# Patient Record
Sex: FEMALE | Race: WHITE | Employment: UNEMPLOYED | URBAN - METROPOLITAN AREA
[De-identification: names, ages, dates, MRNs, and addresses within clinical notes are randomized per-mention and may not be internally consistent; named-entity substitution may affect disease eponyms.]

---

## 2018-03-13 ENCOUNTER — OFFICE VISIT (OUTPATIENT)
Dept: PRIMARY CARE CLINIC | Age: 23
End: 2018-03-13
Payer: COMMERCIAL

## 2018-03-13 VITALS
BODY MASS INDEX: 22.13 KG/M2 | DIASTOLIC BLOOD PRESSURE: 66 MMHG | TEMPERATURE: 98.4 F | WEIGHT: 141 LBS | HEIGHT: 67 IN | SYSTOLIC BLOOD PRESSURE: 116 MMHG | HEART RATE: 78 BPM

## 2018-03-13 DIAGNOSIS — L70.8 OTHER ACNE: ICD-10-CM

## 2018-03-13 DIAGNOSIS — Z02.1 PHYSICAL EXAM, PRE-EMPLOYMENT: Primary | ICD-10-CM

## 2018-03-13 PROCEDURE — 99213 OFFICE O/P EST LOW 20 MIN: CPT | Performed by: INTERNAL MEDICINE

## 2018-03-13 RX ORDER — LITHIUM CARBONATE 450 MG
TABLET, EXTENDED RELEASE ORAL
Refills: 2 | COMMUNITY
Start: 2018-02-17 | End: 2018-03-13 | Stop reason: SDUPTHER

## 2018-03-13 ASSESSMENT — ENCOUNTER SYMPTOMS
EYE DISCHARGE: 0
NAUSEA: 0
ORTHOPNEA: 0
ABDOMINAL PAIN: 0
BLOOD IN STOOL: 0
SHORTNESS OF BREATH: 0
HEMOPTYSIS: 0
BLURRED VISION: 0

## 2018-03-13 ASSESSMENT — PATIENT HEALTH QUESTIONNAIRE - PHQ9
SUM OF ALL RESPONSES TO PHQ QUESTIONS 1-9: 0
SUM OF ALL RESPONSES TO PHQ9 QUESTIONS 1 & 2: 0
2. FEELING DOWN, DEPRESSED OR HOPELESS: 0
1. LITTLE INTEREST OR PLEASURE IN DOING THINGS: 0

## 2020-12-09 ENCOUNTER — OFFICE VISIT (OUTPATIENT)
Dept: PRIMARY CARE CLINIC | Age: 25
End: 2020-12-09
Payer: COMMERCIAL

## 2020-12-09 VITALS
TEMPERATURE: 99 F | OXYGEN SATURATION: 98 % | DIASTOLIC BLOOD PRESSURE: 80 MMHG | HEART RATE: 88 BPM | WEIGHT: 160 LBS | SYSTOLIC BLOOD PRESSURE: 118 MMHG | BODY MASS INDEX: 25.11 KG/M2 | HEIGHT: 67 IN

## 2020-12-09 LAB
Lab: NORMAL
QC PASS/FAIL: NORMAL
SARS-COV-2, POC: NORMAL

## 2020-12-09 PROCEDURE — 87426 SARSCOV CORONAVIRUS AG IA: CPT | Performed by: NURSE PRACTITIONER

## 2020-12-09 PROCEDURE — G8427 DOCREV CUR MEDS BY ELIG CLIN: HCPCS | Performed by: NURSE PRACTITIONER

## 2020-12-09 PROCEDURE — 4004F PT TOBACCO SCREEN RCVD TLK: CPT | Performed by: NURSE PRACTITIONER

## 2020-12-09 PROCEDURE — G8419 CALC BMI OUT NRM PARAM NOF/U: HCPCS | Performed by: NURSE PRACTITIONER

## 2020-12-09 PROCEDURE — 99213 OFFICE O/P EST LOW 20 MIN: CPT | Performed by: NURSE PRACTITIONER

## 2020-12-09 PROCEDURE — G8484 FLU IMMUNIZE NO ADMIN: HCPCS | Performed by: NURSE PRACTITIONER

## 2020-12-09 RX ORDER — AZITHROMYCIN 250 MG/1
250 TABLET, FILM COATED ORAL SEE ADMIN INSTRUCTIONS
Qty: 6 TABLET | Refills: 0 | Status: SHIPPED | OUTPATIENT
Start: 2020-12-09 | End: 2020-12-14

## 2020-12-09 RX ORDER — BROMPHENIRAMINE MALEATE, PSEUDOEPHEDRINE HYDROCHLORIDE, AND DEXTROMETHORPHAN HYDROBROMIDE 2; 30; 10 MG/5ML; MG/5ML; MG/5ML
10 SYRUP ORAL 4 TIMES DAILY PRN
Qty: 120 ML | Refills: 0 | Status: SHIPPED
Start: 2020-12-09 | End: 2021-01-24 | Stop reason: ALTCHOICE

## 2020-12-09 NOTE — PATIENT INSTRUCTIONS
Juan Knight [address] [place] [id number]  Dept: [address] [place], [place] [id number] CNP      12/9/2020     Patient: Raffy Srinivasan   YOB: 1995       To Whom It May Concern: It is my medical opinion that Raffy Srinivasan should remain out of work while acutely ill and awaiting COVID-19 test results. Return to work with no retesting should be followed if test is negative AND meets these 3 criteria as outlined by CDC/ODH:   a. No fever without the use of fever reducers for 24 hours  b. Improvement in symptoms  c. At least 7 days since the onset of symptoms     If tests positive for COVID-19, needs minimum of 10 days strict quarantine, improvement of symptoms and 24 hours fever free without fever reducing medications. If you have any questions or concerns, please don't hesitate to call.     Sincerely,        JOHN Cole - ALFREDO

## 2020-12-09 NOTE — PROGRESS NOTES
Amanda Spine  1995    Chief Complaint   Patient presents with    Nausea & Vomiting     Pt C/O nausea, vomiting, cough, and ST x 1 week      Respiratory Symptoms:  Patient complains of 1 week(s) history of sore throat, non-productive cough and nausea with vomiting. Symptoms have been worsening with time. She denies any other symptoms . Relevant PMH: No pertinent PMH. Smoking history:  She  reports that she has been smoking cigarettes. She has a 0.25 pack-year smoking history. She has never used smokeless tobacco.     She has had ill contacts with URI symptoms. Treatment to date: [facility]    Travel screen completed:  Yes       Denies history of asthma, COPD, or seasonal allergies. Works at: [employer]    LMP: November 28th, denies chance of pregnancy    Vitals:    12/09/20 1544   BP: 118/80   Pulse: 88   Temp: 99 °F (37.2 °C)   SpO2: 98%     SOB: no  Fever: ,reports low-grade, subjective chills  Cough: yes, green mucus  Nasal congestion/rhinorrhea: rhinorrhea  Sinus pressure: no; headache yes, improved  Myalgias: yes  Sore throat: yes; able to eat & drink  Recent known exposure to COVID-19:  cousin  Nausea & Vomiting: yes  Diarrhea & bloody stools: no    Physical Exam    Physical Exam  Vitals signs and nursing note reviewed. Constitutional:       General: She is not in acute distress. Appearance: Normal appearance. She is well-developed and normal weight. She is ill-appearing. She is not toxic-appearing or diaphoretic. HENT:      Head: Normocephalic and atraumatic. Right Ear: Tympanic membrane, ear canal and external ear normal. There is no impacted cerumen. Left Ear: Tympanic membrane, ear canal and external ear normal. There is no impacted cerumen. Nose: Congestion and rhinorrhea present. Mouth/Throat:      Mouth: Mucous membranes are moist.      Pharynx: Oropharynx is clear. Posterior oropharyngeal erythema present. No oropharyngeal exudate. Comments: Tonsils +1, right sided adenopathy  Eyes:      Extraocular Movements: Extraocular movements intact. Conjunctiva/sclera: Conjunctivae normal.      Pupils: Pupils are equal, round, and reactive to light. Neck:      Musculoskeletal: Normal range of motion and neck supple. Thyroid: No thyromegaly. Cardiovascular:      Rate and Rhythm: Normal rate and regular rhythm. Pulses: Normal pulses. Heart sounds: Normal heart sounds. No murmur. Pulmonary:      Effort: Pulmonary effort is normal. No respiratory distress. Breath sounds: Normal breath sounds. No wheezing. Abdominal:      General: Bowel sounds are normal.      Palpations: Abdomen is soft. Tenderness: There is no abdominal tenderness. There is no guarding or rebound. Genitourinary:     Vagina: Normal.   Musculoskeletal: Normal range of motion. Lymphadenopathy:      Cervical: No cervical adenopathy. Skin:     General: Skin is warm and dry. Capillary Refill: Capillary refill takes less than 2 seconds. Findings: No bruising, erythema or rash. Neurological:      General: No focal deficit present. Mental Status: She is alert and oriented to person, place, and time. Mental status is at baseline. Cranial Nerves: No cranial nerve deficit. Sensory: No sensory deficit. Motor: No weakness. Coordination: Coordination normal.      Gait: Gait normal.   Psychiatric:         Mood and Affect: Mood normal.         Behavior: Behavior normal.         Thought Content: Thought content normal.         Judgment: Judgment normal.         Assessment/Plan:    ASSESSMENT/PLAN:    1. COVID-19  - POCT COVID-19, Antigen (-) in office today  - COVID-19 Ambulatory; Future    2. Tonsillitis  - azithromycin (ZITHROMAX) 250 MG tablet; Take 1 tablet by mouth See Admin Instructions for 5 days 500mg on day 1 followed by 250mg on days 2 - 5  Dispense: 6 tablet; Refill: 0    3.  Cough  - brompheniramine-pseudoephedrine-DM (BROMFED DM) 2-30-10 MG/5ML syrup; Take 10 mLs by mouth 4 times daily as needed for Congestion or Cough  Dispense: 120 mL; Refill: 0      - Conservative methods & OTC for symptomatic relief provided  -  Red flag items discussed  - Work/school instructions provided in AVS    This visit was provided as a focused evaluation during the COVID -19 pandemic/national emergency. A comprehensive review of all previous patient history and testing was not conducted. Pertinent findings were elicited during the visit.

## 2020-12-10 DIAGNOSIS — U07.1 COVID-19: ICD-10-CM

## 2020-12-10 LAB — SARS-COV-2, PCR: NOT DETECTED

## 2021-01-24 ENCOUNTER — HOSPITAL ENCOUNTER (EMERGENCY)
Age: 26
Discharge: HOME | End: 2021-01-24
Attending: EMERGENCY MEDICINE
Payer: COMMERCIAL

## 2021-01-24 VITALS
OXYGEN SATURATION: 99 % | SYSTOLIC BLOOD PRESSURE: 128 MMHG | HEART RATE: 84 BPM | RESPIRATION RATE: 16 BRPM | TEMPERATURE: 98.8 F | WEIGHT: 150 LBS | DIASTOLIC BLOOD PRESSURE: 80 MMHG | BODY MASS INDEX: 23.49 KG/M2

## 2021-01-24 DIAGNOSIS — G51.0 BELL'S PALSY: Primary | ICD-10-CM

## 2021-01-24 LAB
HCG, URINE, POC: NEGATIVE
Lab: NORMAL
NEGATIVE QC PASS/FAIL: NORMAL
POSITIVE QC PASS/FAIL: NORMAL

## 2021-01-24 PROCEDURE — 99283 EMERGENCY DEPT VISIT LOW MDM: CPT

## 2021-01-24 PROCEDURE — 6370000000 HC RX 637 (ALT 250 FOR IP): Performed by: STUDENT IN AN ORGANIZED HEALTH CARE EDUCATION/TRAINING PROGRAM

## 2021-01-24 RX ORDER — PREDNISONE 20 MG/1
60 TABLET ORAL ONCE
Status: COMPLETED | OUTPATIENT
Start: 2021-01-24 | End: 2021-01-24

## 2021-01-24 RX ORDER — ERYTHROMYCIN 20 MG/G
GEL TOPICAL NIGHTLY
Qty: 1 TUBE | Refills: 0 | Status: SHIPPED | OUTPATIENT
Start: 2021-01-24 | End: 2022-05-17 | Stop reason: ALTCHOICE

## 2021-01-24 RX ORDER — VALACYCLOVIR HYDROCHLORIDE 500 MG/1
1000 TABLET, FILM COATED ORAL ONCE
Status: DISCONTINUED | OUTPATIENT
Start: 2021-01-24 | End: 2021-01-24

## 2021-01-24 RX ORDER — VALACYCLOVIR HYDROCHLORIDE 1 G/1
1000 TABLET, FILM COATED ORAL 3 TIMES DAILY
Qty: 20 TABLET | Refills: 0 | Status: SHIPPED | OUTPATIENT
Start: 2021-01-24 | End: 2021-01-24

## 2021-01-24 RX ORDER — PREDNISONE 20 MG/1
60 TABLET ORAL DAILY
Qty: 18 TABLET | Refills: 0 | Status: SHIPPED | OUTPATIENT
Start: 2021-01-24 | End: 2021-01-30

## 2021-01-24 RX ADMIN — PREDNISONE 60 MG: 20 TABLET ORAL at 15:59

## 2021-01-24 ASSESSMENT — ENCOUNTER SYMPTOMS
ABDOMINAL DISTENTION: 0
BACK PAIN: 0
VOMITING: 0
DIARRHEA: 0
PHOTOPHOBIA: 0
COLOR CHANGE: 0
SINUS PRESSURE: 0
WHEEZING: 0
SHORTNESS OF BREATH: 0
SORE THROAT: 0
COUGH: 0
NAUSEA: 0

## 2021-01-24 NOTE — ED PROVIDER NOTES
[address]      Pt Name: Nadine Velasquez  MRN: [id number]  [place] 1995  Date of evaluation: 1/24/2021      CHIEF COMPLAINT       Chief Complaint   Patient presents with    Other     right sided facial droop        HPI  Nadine Velasquez is a 30 y.o. female with a past medical history of bipolar disorder on Invega, acne on Accutane presents with complaints of right-sided facial droop starting yesterday. But states that she has had eye tearing for the last 7 days. States that she does have difficulty noticing her face that she will require some mask. .  States that she does not have a history. No prodromal symptoms. No alleviating or exacerbating factors. She does not take any medications measures alleviate her symptoms. Denies any fevers, chills, nausea, vomiting, chest pain, shortness of breath, dumping flank pain, dysuria, hematuria, diarrhea, constipation or new rashes or sores. Except as noted above the remainder of the review of systems was reviewed and negative. Review of Systems   Constitutional: Negative for chills and fever. HENT: Negative for ear pain, sinus pressure and sore throat. Eyes: Negative for photophobia and visual disturbance. Respiratory: Negative for cough, shortness of breath and wheezing. Cardiovascular: Negative for chest pain. Gastrointestinal: Negative for abdominal distention, diarrhea, nausea and vomiting. Genitourinary: Negative for dysuria and frequency. Musculoskeletal: Negative for arthralgias and back pain. Skin: Negative for color change, pallor, rash and wound. Neurological: Negative for weakness and headaches. Hematological: Negative for adenopathy. All other systems reviewed and are negative. Physical Exam  Vitals signs and nursing note reviewed. Constitutional:       General: She is not in acute distress. Appearance: Normal appearance.  She is well-developed. She is not ill-appearing or diaphoretic. HENT:      Head: Normocephalic and atraumatic. Nose: Nose normal.   Eyes:      Extraocular Movements: Extraocular movements intact. Pupils: Pupils are equal, round, and reactive to light. Neck:      Musculoskeletal: Normal range of motion and neck supple. Cardiovascular:      Rate and Rhythm: Normal rate and regular rhythm. Pulses: Normal pulses. Heart sounds: Normal heart sounds. Pulmonary:      Effort: Pulmonary effort is normal. No respiratory distress. Breath sounds: Normal breath sounds. No wheezing or rales. Abdominal:      General: Bowel sounds are normal.      Palpations: Abdomen is soft. Tenderness: There is no abdominal tenderness. There is no guarding or rebound. Musculoskeletal: Normal range of motion. Skin:     General: Skin is warm and dry. Capillary Refill: Capillary refill takes less than 2 seconds. Neurological:      Mental Status: She is alert and oriented to person, place, and time. Cranial Nerves: No cranial nerve deficit. Sensory: No sensory deficit. Coordination: Coordination normal.      Comments: Complete facial paralysis of right side of face. Psychiatric:         Mood and Affect: Mood normal.         Behavior: Behavior normal.          Procedures     MDM  Number of Diagnoses or Management Options  Bell's palsy  Diagnosis management comments: 24-year-old female with past medical history of acne on Accutane, bipolar disorder recently started on Invega presents with complaints of complete right facial paralysis starting yesterday. States that she did have eye tearing for the last week. States is never happened before. Vitals within normal limits. On physical exam patient is in no acute respiratory process, oriented x3. She is complete facial paralysis right side of her face. Normal S1-S2. Abdomen nontender. Musculoskeletal bilaterally.   No bilateral leg edema. Patient has complete paralysis of the right side of her face. Patient is unable to close her eyes fully. POC pregnancy negative. From her history is sounds like her symptoms started a week prior. She does not meet criteria of receiving valacyclovir as she is past 3 days window. Patient will be given prednisone and erythromycin vomiting solution in the department. She will continue prednisone therapy for the next 6 days. She was instructed to use expiratory myosin gel on her eyes at night. She was informed of all the results for evaluation. She is agreeable plan for discharge. Patient is awake alert, hemodynamic stable, afebrile and in no respiratory distress. Discussed with patient plan for close outpatient follow-up with the patient's PCP as well as return precautions and the patient understands and agrees to the plan. ED Course as of Jan 25 0415   Sun Jan 24, 2021   1530 ATTENDING PROVIDER ATTESTATION:     Bryant Dickey presented to the emergency department for evaluation of [unfilled]  I have reviewed and discussed the case, including pertinent history (medical, surgical, family and social) and exam findings with the Midlevel and the Nurse assigned to Bryant Dickey. I have personally performed and/or participated in the history, exam, medical decision making, and procedures and agree with all pertinent clinical information. I have reviewed my findings and recommendations with Bryant Dickey and members of family present at the time of disposition. My findings/plan: Patient is a 26-year-old female who presents with a chief complaint of right-sided facial droop. The patient states that she started having tearing of her right eye about a week ago and she was told to get rewetting drops. For the past couple days people started noticed that she has a droop of her right side of her face. Patient denies any recent illnesses. Denies any recent head trauma or falls. She does of a history of bipolar disorder and did receive an intramuscular injection of Invega 2 weeks ago. This was the first dose. No other medication changes. She is on Accutane that was started in October 2020. Patient denies any fevers, chills, lightheadedness, dizziness, blurred vision, chest pain, shortness of breath, abdominal pain, nausea, vomiting. On examination the patient is resting comfortably in bed. No external evidence of head trauma. No hemotympanum noted. Pupils are equal reactive bilaterally. No conjunctival injection noted bilaterally. Patient does have right-sided facial paralysis including the forehead. She is unable to fully close her right eyelid. Clear breath sounds in all lung fields. Regular rate and rhythm of the heart. No abdominal tenderness palpation. No lower extremity edema. Besides a facial droop the patient has no other focal neurological deficits. Tarik Bradford DO      [MS]      ED Course User Index  [MS] Melina Floyd DO           ED Course as of Jan 25 0415   Sun Jan 24, 2021   1530 ATTENDING PROVIDER ATTESTATION:     Laya Akins presented to the emergency department for evaluation of [unfilled]  I have reviewed and discussed the case, including pertinent history (medical, surgical, family and social) and exam findings with the Midlevel and the Nurse assigned to Laya Akins. I have personally performed and/or participated in the history, exam, medical decision making, and procedures and agree with all pertinent clinical information. I have reviewed my findings and recommendations with Laya Akins and members of family present at the time of disposition. My findings/plan: Patient is a 75-year-old female who presents with a chief complaint of right-sided facial droop. The patient states that she started having tearing of her right eye about a week ago and she was told to get rewetting drops.   For the past couple days people started noticed that she has a droop of her right side of her face. Patient denies any recent illnesses. Denies any recent head trauma or falls. She does of a history of bipolar disorder and did receive an intramuscular injection of Invega 2 weeks ago. This was the first dose. No other medication changes. She is on Accutane that was started in October 2020. Patient denies any fevers, chills, lightheadedness, dizziness, blurred vision, chest pain, shortness of breath, abdominal pain, nausea, vomiting. On examination the patient is resting comfortably in bed. No external evidence of head trauma. No hemotympanum noted. Pupils are equal reactive bilaterally. No conjunctival injection noted bilaterally. Patient does have right-sided facial paralysis including the forehead. She is unable to fully close her right eyelid. Clear breath sounds in all lung fields. Regular rate and rhythm of the heart. No abdominal tenderness palpation. No lower extremity edema. Besides a facial droop the patient has no other focal neurological deficits. Nori Cruz DO      [MS]      ED Course User Index  [MS] Laurel Molina DO       --------------------------------------------- PAST HISTORY ---------------------------------------------  Past Medical History:  has a past medical history of Acne and Bipolar disorder ([facility] [address] Past Surgical History:  has a past surgical history that includes other surgical history (Left, 06 20 2014). Social History:  reports that she has been smoking cigarettes. She has a 0.25 pack-year smoking history. She has never used smokeless tobacco. She reports that she does not drink alcohol or use drugs. Family History: family history includes Cancer in her father; Heart Disease in her father; High Blood Pressure in her father. The patients home medications have been reviewed. Allergies: Patient has no known allergies.     -------------------------------------------------- RESULTS -------------------------------------------------  Labs:  Results for orders placed or performed during the hospital encounter of 01/24/21   POC Pregnancy Urine   Result Value Ref Range    HCG, Urine, POC Negative Negative    Lot Number [id number]     Positive QC Pass/Fail Pass     Negative QC Pass/Fail Pass        Radiology:  No orders to display       ------------------------- NURSING NOTES AND VITALS REVIEWED ---------------------------  Date / Time Roomed:  1/24/2021  2:58 PM  ED Bed Assignment:  14/14    The nursing notes within the ED encounter and vital signs as below have been reviewed. /80   Pulse 84   Temp 98.8 °F (37.1 °C) (Oral)   Resp 16   Wt 150 lb (68 kg)   LMP 01/16/2021 (Approximate)   SpO2 99%   BMI 23.49 kg/m²   Oxygen Saturation Interpretation: normal      ------------------------------------------ PROGRESS NOTES ------------------------------------------    I have spoken with the patient and discussed todays results, in addition to providing specific details for the plan of care and counseling regarding the diagnosis and prognosis. Their questions are answered at this time and they are agreeable with the plan. I discussed at length with them reasons for immediate return here for re evaluation. They will followup with their PCP by calling their office tomorrow. --------------------------------- ADDITIONAL PROVIDER NOTES ---------------------------------  At this time the patient is without objective evidence of an acute process requiring hospitalization or inpatient management. They have remained hemodynamically stable throughout their entire ED visit and are stable for discharge with outpatient follow-up. The plan has been discussed in detail and they are aware of the specific conditions for emergent return, as well as the importance of follow-up.       Discharge Medication List as of 1/24/2021  4:06 PM      START taking these medications    Details   predniSONE

## 2021-04-26 ENCOUNTER — TELEPHONE (OUTPATIENT)
Dept: ADMINISTRATIVE | Age: 26
End: 2021-04-26

## 2021-04-26 ENCOUNTER — HOSPITAL ENCOUNTER (EMERGENCY)
Age: 26
Discharge: HOME | End: 2021-04-26
Payer: COMMERCIAL

## 2021-04-26 VITALS
RESPIRATION RATE: 16 BRPM | WEIGHT: 165 LBS | TEMPERATURE: 98.3 F | SYSTOLIC BLOOD PRESSURE: 122 MMHG | OXYGEN SATURATION: 98 % | HEART RATE: 71 BPM | BODY MASS INDEX: 25.9 KG/M2 | DIASTOLIC BLOOD PRESSURE: 83 MMHG | HEIGHT: 67 IN

## 2021-04-26 DIAGNOSIS — L72.0 EPIDERMAL CYST: Primary | ICD-10-CM

## 2021-04-26 PROCEDURE — 99283 EMERGENCY DEPT VISIT LOW MDM: CPT

## 2021-04-26 PROCEDURE — 10060 I&D ABSCESS SIMPLE/SINGLE: CPT

## 2021-04-26 RX ORDER — CEPHALEXIN 500 MG/1
500 CAPSULE ORAL 4 TIMES DAILY
Qty: 28 CAPSULE | Refills: 0 | Status: SHIPPED | OUTPATIENT
Start: 2021-04-26 | End: 2021-05-03

## 2021-04-26 ASSESSMENT — PAIN SCALES - GENERAL: PAINLEVEL_OUTOF10: 4

## 2021-04-26 NOTE — ED PROVIDER NOTES
[facility]                                                                                                                                    Department of Emergency Medicine   ED  Provider Note  Admit Date/RoomTime: 4/26/2021  5:47 PM  ED Room: [place] [address]        HPI:  4/26/21,   Time: 6:13 PM EDT         Tre Jimenes is a 30 y.o. female presenting to the ED for painful lump neck, beginning few days ago. The complaint has been persistent, moderate in severity, and worsened by touching/pressure. Pt and mother concerned about swelling/lump left anterior neck region. No drainage from site. Mildly tender to palpation. Mom concerned about cancer/mass. Pt feels well otherwise. No fever, chills, weight loss, CP or SOB. ROS:     Constitutional: Negative for fever and chills  HENT: Negative for ear pain, sore throat and sinus pressure  Eyes: Negative for pain, discharge and redness  Respiratory:  Negative for shortness of breath, cough and wheezing  Cardiovascular: Negative for CP, edema or palpitations  Gastrointestinal: Negative for nausea, vomiting, diarrhea and abdominal distention  Genitourinary: Negative for dysuria and frequency  Musculoskeletal: Negative for back pain and arthralgia  Skin:  See HPI  Neurological: Negative for weakness and headaches  Hematological: Negative for adenopathy    All other systems reviewed and are negative      -------------------------------- PAST HISTORY ----------------------------------  Past Medical History:  has a past medical history of Acne and Bipolar disorder ([facility] [address] Past Surgical History:  has a past surgical history that includes other surgical history (Left, 06 20 2014). Social History:  reports that she has been smoking cigarettes. She has a 0.25 pack-year smoking history. She has never used smokeless tobacco. She reports that she does not drink alcohol or use drugs.     Family History: family history includes Cancer in her father; Heart Disease in her father; High Blood Pressure in her father. The patients home medications have been reviewed. Allergies: Patient has no known allergies. --------------------------------- RESULTS ------------------------------------------  All laboratory and radiology results have been personally reviewed by myself   LABS:  No results found for this visit on 04/26/21. RADIOLOGY:  Interpreted by Radiologist.  No orders to display       ----------------- NURSING NOTES AND VITALS REVIEWED ---------------   The nursing notes within the ED encounter and vital signs as below have been reviewed. /83   Pulse 71   Temp 98.3 °F (36.8 °C) (Oral)   Resp 16   Ht 5' 7\" (1.702 m)   Wt 165 lb (74.8 kg)   LMP 03/26/2021   SpO2 98%   BMI 25.84 kg/m²   Oxygen Saturation Interpretation: Normal      --------------------------------PHYSICAL EXAM------------------------------------      Constitutional/General: Alert and oriented x3, well appearing, non toxic in NAD  Head: NC/AT  Eyes: PERRL, EOMI  Mouth: Oropharynx clear, handling secretions, no trismus  Neck: Supple, full ROM, no meningeal signs  Pulmonary: Lungs clear to auscultation bilaterally, no wheezes, rales, or rhonchi. Not in respiratory distress  Cardiovascular:  Regular rate and rhythm, no murmurs, gallops, or rubs. 2+ distal pulses  Extremities: Moves all extremities x 4. Warm and well perfused  Skin:  Pt has tender, mobile 1 cm cyst overlying left neck, just lateral to thyroid. This is palpable within the skin with tiny central puncture with visible white material.   See procedure. Neurologic: GCS 15,  Intact. No focal deficits  Psych: Normal Affect      ------------------------ ED COURSE/MEDICAL DECISION MAKING----------------------  Medications - No data to display      Procedure:   1 cm mobile cyst neck. Cleansed with Betadine and numbed with 2 cc 1 % Lidocaine. 11 blade used to create . 5 cm opening. Tiny amount sebum drained.

## 2021-04-26 NOTE — TELEPHONE ENCOUNTER
It's been over three years that patient has been seen, she would have to re-establish, last ov was 3/13/2018.

## 2021-04-26 NOTE — TELEPHONE ENCOUNTER
Lm since it has been over 3 years, pt will have to re-establish with pcp for future appts but is able to be seen in express care today.

## 2021-04-26 NOTE — ED NOTES
Wound on neck cleaned with sterile water and bandaid placed     Fatmata Chaney [place]  77/[id number]

## 2021-05-19 ENCOUNTER — OFFICE VISIT (OUTPATIENT)
Dept: SURGERY | Age: 26
End: 2021-05-19
Payer: COMMERCIAL

## 2021-05-19 VITALS
HEIGHT: 67 IN | SYSTOLIC BLOOD PRESSURE: 106 MMHG | TEMPERATURE: 97.7 F | WEIGHT: 165.5 LBS | BODY MASS INDEX: 25.98 KG/M2 | RESPIRATION RATE: 16 BRPM | DIASTOLIC BLOOD PRESSURE: 76 MMHG | HEART RATE: 92 BPM

## 2021-05-19 DIAGNOSIS — R22.9 MASS OF SKIN: Primary | ICD-10-CM

## 2021-05-19 PROCEDURE — 4004F PT TOBACCO SCREEN RCVD TLK: CPT | Performed by: PHYSICIAN ASSISTANT

## 2021-05-19 PROCEDURE — G8419 CALC BMI OUT NRM PARAM NOF/U: HCPCS | Performed by: PHYSICIAN ASSISTANT

## 2021-05-19 PROCEDURE — G8427 DOCREV CUR MEDS BY ELIG CLIN: HCPCS | Performed by: PHYSICIAN ASSISTANT

## 2021-05-19 PROCEDURE — 99202 OFFICE O/P NEW SF 15 MIN: CPT | Performed by: PHYSICIAN ASSISTANT

## 2021-05-19 NOTE — PROGRESS NOTES
Department of Plastic Surgery - Adult  Attending Consult Note      CHIEF COMPLAINT:   Mass of left neck    History Obtained From:  Patient, mother    HISTORY OF PRESENT ILLNESS:                The patient is a 30 y.o. female who presents with recently incised and drained abscess of the left neck. The patient presents her office today to have this area examined. She states he still has a suture intact. She was given antibiotics and has completed them. She presents with her mother today to have the area examined      Past Medical History:    Past Medical History:   Diagnosis Date    Acne     Bipolar disorder ([address]      Past Surgical History:    Past Surgical History:   Procedure Laterality Date    OTHER SURGICAL HISTORY Left 06 20 2014    [place] bunionectomy     Current Medications:      Current Outpatient Medications   Medication Sig Dispense Refill    lithium 150 MG capsule Take 150 mg by mouth 3 times daily (with meals) States she is weaning off unsure of dose at this time      erythromycin with ethanol (ERYGEL) 2 % gel Apply topically nightly (Patient not taking: Reported on 5/19/2021) 1 Tube 0    paliperidone palmitate ER (INVEGA SUSTENNA) 156 MG/ML CHRIS IM injection Inject 156 mg into the muscle every 30 days (Patient not taking: Reported on 5/19/2021)       No current facility-administered medications for this visit. Allergies:  Patient has no known allergies.     Social History:   Social History     Socioeconomic History    Marital status: Single     Spouse name: Not on file    Number of children: Not on file    Years of education: Not on file    Highest education level: Not on file   Occupational History    Not on file   Tobacco Use    Smoking status: Current Every Day Smoker     Packs/day: 0.50     Years: 0.50     Pack years: 0.25     Types: Cigarettes    Smokeless tobacco: Never Used   Vaping Use    Vaping Use: Never used   Substance and Sexual Activity    Alcohol use: No    Drug use: No    Sexual activity: Not on file   Other Topics Concern    Not on file   Social History Narrative    Not on file     Social Determinants of Health     Financial Resource Strain:     Difficulty of Paying Living Expenses:    Food Insecurity:     Worried About Running Out of Food in the Last Year:     [address] in the Last Year:    Transportation Needs:     Lack of Transportation (Medical):  Lack of Transportation (Non-Medical):    Physical Activity:     Days of Exercise per Week:     Minutes of Exercise per Session:    Stress:     Feeling of Stress :    Social Connections:     Frequency of Communication with Friends and Family:     Frequency of Social Gatherings with Friends and Family:     Attends Mormonism Services:     Active Member of Clubs or Organizations:     Attends Club or Organization Meetings:     Marital Status:    Intimate Partner Violence:     Fear of Current or Ex-Partner:     Emotionally Abused:     Physically Abused:     Sexually Abused:      Family History:   Family History   Problem Relation Age of Onset    Cancer Father     High Blood Pressure Father     Heart Disease Father        REVIEW OF SYSTEMS:    CONSTITUTIONAL:  negative for  fevers, chills, sweats and fatigue  EYES: negative for dipolpia or acute vision loss. RESPIRATORY:  negative for  dry cough, cough with sputum, dyspnea, wheezing and chest pain  HENT:negative for pain, headache, difficulty swallowing or nose bleeds. CARDIOVASCULAR:  negative for  chest pain, dyspnea, palpitations, syncope  GASTROINTESTINAL:  negative for nausea, vomiting, change in bowel habits, diarrhea, constipation and abdominal pain  EXTREMITIES: negative for edema  MUSCULOSKELETAL: negative for muscle weakness  SKIN: positive for lesionnegative for itching or rashes.   HEME: negative for easy brusing or bleeding  BEHAVIOR/PSYCH:  negative for poor appetite, increased appetite, decreased sleep and poor concentration  PSYCH: Alert and oriented to person place and time        PHYSICAL EXAM:            PHYSICAL EXAM:    VITALS:  /76 (Site: Left Upper Arm, Position: Sitting, Cuff Size: Medium Adult)   Pulse 92   Temp 97.7 °F (36.5 °C) (Temporal)   Resp 16   Ht 5' 7\" (1.702 m)   Wt 165 lb 8 oz (75.1 kg)   BMI 25.92 kg/m²   CONSTITUTIONAL:  awake, alert, cooperative, no apparent distress, and appears stated age  EYES: PERRLA, EOMI, no signs of occular infection  LUNGS:  No increased work of breathing, good air exchange, clear to auscultation bilaterally, no crackles or wheezing  CARDIOVASCULAR:  Normal apical impulse, regular rate and rhythm,   EXTREMITIES: no signs of clubbing or cyanosis. MUSCULOSKELETAL: negative for flaccid muscle tone or spastic movements. NEURO: Cranial nerves II-XII grossly intact. No signs of agitated mood. SKIN: Left neck prior abscess-no palpable masses appreciated. There is a single interrupted suture intact no signs of infection no pain or tenderness on palpation        DATA:    Labs: CBC: No results found for: WBC, RBC, HGB, HCT, MCV, MCH, MCHC, RDW, PLT, MPV  BMP:  No results found for: NA, K, CL, CO2, BUN, LABALBU, CREATININE, CALCIUM, GFRAA, LABGLOM, GLUCOSE    Radiology Review:  No radiology needed at this time    IMPRESSION/RECOMMENDATIONS:      History of left neck abscess. Suture to the left neck incision and drainage site removed today. I informed patient that there is no abscess or subcutaneous mass at this time. I informed the patient that this may reaccumulate future and she may require surgical intervention should this return. Advised patient to begin massage to this area to aid in scar breakdown. I informed the patient mother that there will always be a scar in this area however the more massage and that they do now the more of the likelihood the scar will relax. The patient's mother voiced understanding.     Follow-up as needed

## 2022-05-17 ENCOUNTER — OFFICE VISIT (OUTPATIENT)
Dept: FAMILY MEDICINE CLINIC | Age: 27
End: 2022-05-17
Payer: COMMERCIAL

## 2022-05-17 VITALS
OXYGEN SATURATION: 98 % | TEMPERATURE: 97.8 F | SYSTOLIC BLOOD PRESSURE: 120 MMHG | BODY MASS INDEX: 25.53 KG/M2 | WEIGHT: 163 LBS | HEART RATE: 95 BPM | DIASTOLIC BLOOD PRESSURE: 78 MMHG

## 2022-05-17 DIAGNOSIS — Z76.89 ENCOUNTER TO ESTABLISH CARE: Primary | ICD-10-CM

## 2022-05-17 DIAGNOSIS — F31.62 BIPOLAR DISORDER, CURRENT EPISODE MIXED, MODERATE (HCC): ICD-10-CM

## 2022-05-17 DIAGNOSIS — D17.0 LIPOMA OF SKIN, FACE: ICD-10-CM

## 2022-05-17 PROCEDURE — 4004F PT TOBACCO SCREEN RCVD TLK: CPT | Performed by: FAMILY MEDICINE

## 2022-05-17 PROCEDURE — G8427 DOCREV CUR MEDS BY ELIG CLIN: HCPCS | Performed by: FAMILY MEDICINE

## 2022-05-17 PROCEDURE — 99203 OFFICE O/P NEW LOW 30 MIN: CPT | Performed by: FAMILY MEDICINE

## 2022-05-17 PROCEDURE — G8419 CALC BMI OUT NRM PARAM NOF/U: HCPCS | Performed by: FAMILY MEDICINE

## 2022-05-17 RX ORDER — PALIPERIDONE 3 MG/1
TABLET, EXTENDED RELEASE ORAL
COMMUNITY
Start: 2022-02-23 | End: 2022-05-17 | Stop reason: ALTCHOICE

## 2022-05-17 SDOH — ECONOMIC STABILITY: FOOD INSECURITY: WITHIN THE PAST 12 MONTHS, YOU WORRIED THAT YOUR FOOD WOULD RUN OUT BEFORE YOU GOT MONEY TO BUY MORE.: NEVER TRUE

## 2022-05-17 SDOH — ECONOMIC STABILITY: FOOD INSECURITY: WITHIN THE PAST 12 MONTHS, THE FOOD YOU BOUGHT JUST DIDN'T LAST AND YOU DIDN'T HAVE MONEY TO GET MORE.: NEVER TRUE

## 2022-05-17 ASSESSMENT — PATIENT HEALTH QUESTIONNAIRE - PHQ9
3. TROUBLE FALLING OR STAYING ASLEEP: 2
SUM OF ALL RESPONSES TO PHQ QUESTIONS 1-9: 5
SUM OF ALL RESPONSES TO PHQ QUESTIONS 1-9: 5
4. FEELING TIRED OR HAVING LITTLE ENERGY: 0
8. MOVING OR SPEAKING SO SLOWLY THAT OTHER PEOPLE COULD HAVE NOTICED. OR THE OPPOSITE, BEING SO FIGETY OR RESTLESS THAT YOU HAVE BEEN MOVING AROUND A LOT MORE THAN USUAL: 0
SUM OF ALL RESPONSES TO PHQ QUESTIONS 1-9: 5
6. FEELING BAD ABOUT YOURSELF - OR THAT YOU ARE A FAILURE OR HAVE LET YOURSELF OR YOUR FAMILY DOWN: 1
7. TROUBLE CONCENTRATING ON THINGS, SUCH AS READING THE NEWSPAPER OR WATCHING TELEVISION: 0
9. THOUGHTS THAT YOU WOULD BE BETTER OFF DEAD, OR OF HURTING YOURSELF: 0
10. IF YOU CHECKED OFF ANY PROBLEMS, HOW DIFFICULT HAVE THESE PROBLEMS MADE IT FOR YOU TO DO YOUR WORK, TAKE CARE OF THINGS AT HOME, OR GET ALONG WITH OTHER PEOPLE: 1
1. LITTLE INTEREST OR PLEASURE IN DOING THINGS: 0
2. FEELING DOWN, DEPRESSED OR HOPELESS: 1
5. POOR APPETITE OR OVEREATING: 1
SUM OF ALL RESPONSES TO PHQ QUESTIONS 1-9: 5
SUM OF ALL RESPONSES TO PHQ9 QUESTIONS 1 & 2: 1

## 2022-05-17 ASSESSMENT — SOCIAL DETERMINANTS OF HEALTH (SDOH): HOW HARD IS IT FOR YOU TO PAY FOR THE VERY BASICS LIKE FOOD, HOUSING, MEDICAL CARE, AND HEATING?: NOT HARD AT ALL

## 2022-05-17 NOTE — PROGRESS NOTES
OFFICE PROGRESS NOTE      SUBJECTIVE:        Patient ID:   Vy Oneill is a 27 y.o. female who presents for   Chief Complaint   Patient presents with   [facility] Established New Doctor    Mass     on forehead           HPI:     ESTABLISH CARE. HAS A SMALL LUMP ON THE FOREHEAD FOR PAST 6 MONTHS. WANTS IT REMOVED. NO PAIN OR OTHER ASSOCIATED SYMPTOMS. FEELS GOOD. EATING  GOOD. PHYSICALLY ACTIVE  FOR EXERCISE. TAKING MEDICATIONS AS PER PSYCHIATRIST REGULARLY. Prior to Admission medications    Medication Sig Start Date End Date Taking? Authorizing Provider   paliperidone palmitate ER (INVEGA SUSTENNA) 156 MG/ML CHRIS IM injection Inject 156 mg into the muscle every 30 days    Yes Historical Provider, MD     Social History     Socioeconomic History    Marital status: Single     Spouse name: None    Number of children: None    Years of education: None    Highest education level: None   Occupational History    None   Tobacco Use    Smoking status: Current Every Day Smoker     Packs/day: 0.50     Years: 0.50     Pack years: 0.25     Types: Cigarettes    Smokeless tobacco: Never Used   Vaping Use    Vaping Use: Never used   Substance and Sexual Activity    Alcohol use: No    Drug use: No    Sexual activity: None   Other Topics Concern    None   Social History Narrative    None     Social Determinants of Health     Financial Resource Strain: Low Risk     Difficulty of Paying Living Expenses: Not hard at all   Food Insecurity: No Food Insecurity    Worried About Running Out of Food in the Last Year: Never true    [place] of Food in the Last Year: Never true   Transportation Needs:     Lack of Transportation (Medical): Not on file    Lack of Transportation (Non-Medical):  Not on file   Physical Activity:     Days of Exercise per Week: Not on file    Minutes of Exercise per Session: Not on file   Stress:     Feeling of Stress : Not on file   Social Connections:     Frequency of Communication with Friends and Family: Not on file    Frequency of Social Gatherings with Friends and Family: Not on file    Attends Mormonism Services: Not on file    Active Member of Clubs or Organizations: Not on file    Attends Club or Organization Meetings: Not on file    Marital Status: Not on file   Intimate Partner Violence:     Fear of Current or Ex-Partner: Not on file    Emotionally Abused: Not on file    Physically Abused: Not on file    Sexually Abused: Not on file   Housing Stability:     Unable to Pay for Housing in the Last Year: Not on file    Number of [place] in the Last Year: Not on file    Unstable Housing in the Last Year: Not on file       I have reviewed Noe's allergies, medications, problem list, medical, social and family history and have updated as needed in the electronic medical record  Review Of Systems:    Skin: LUMP ON THE LEFT SIDE OF FOREHEAD.  no abnormal pigmentation, rash, scaling, itching,  hair or nail changes  Eyes: no blurring, diplopia, or eye pain  Ears/Nose/Throat: no hearing loss, tinnitus, vertigo, nosebleed, nasal congestion, rhinorrhea, sore throat  Respiratory: no cough, pleuritic chest pain, dyspnea, or wheezing  Cardiovascular: no chest pain, angina, dyspnea on exertion, orthopnea, PND, palpitations, or claudication  Gastrointestinal: no nausea, vomiting, heartburn, diarrhea, constipation, bloating,  abdominal pain, or blood per rectum. Appetite is good  Genitourinary: no urinary urgency, frequency, dysuria, nocturia, hesitancy, or incontinence  Musculoskeletal:  Ambulating well  Neurologic: no paralysis, paresis, paresthesia, seizures, tremors, or headaches  Hematologic/Lymphatic/Immunologic: no anemia, abnormal bleeding/bruising, fever, chills, night sweats, swollen glands, or unexplained weight loss  Endocrine: no heat or cold intolerance and no polyphagia, polydipsia, or polyuria        OBJECTIVE:     VS:  Wt Readings from Last 3 Encounters:   05/17/22 163 lb (73.9 kg)   05/19/21 165 lb 8 oz (75.1 kg)   04/26/21 165 lb (74.8 kg)     Temp Readings from Last 3 Encounters:   05/17/22 97.8 °F (36.6 °C)   05/19/21 97.7 °F (36.5 °C) (Temporal)   04/26/21 98.3 °F (36.8 °C) (Oral)     BP Readings from Last 3 Encounters:   05/17/22 120/78   05/19/21 106/76   04/26/21 122/83        General appearance: Alert, Awake, Oriented times 3, no distress  Skin: Warm and dry. SMALL SOFT 1 CM ROUND LUMP NOTED ON THE LEFT SIDE OF FOREHEAD UNDERNEATH THE SKIN. FREELY MOBILE, NON TENDER. Head: Normocephalic. No masses, lesions or tenderness noted  Eyes: Conjunctivae appear normal. PERLE  Ears: External ears normal  Nose/Sinuses: Nares normal. Septum midline. Mucosa normal. No drainage  Oropharynx: Oropharynx clear with no exudate seen  Neck: Neck supple. No jugular venous distension, lymphadenopathy or thyromegaly Trachea midline  Chest:  Normal. Movements are Normal and Equal.  Lungs: Lungs clear to auscultation bilaterally. No ronchi, crackles or wheezes  Heart: S1 S2  Regular rate and rhythm. No rub, murmur or gallop  Abdomen: Abdomen soft, non-tender. BS normal. No masses, organomegaly. Back: Grossly Normal and Equal. DTR are Normal. SLR is Normal.  Extremities: Arthritic changes are noted. Movements are limited. Pedal pulses are normal.  Musculoskeletal: Muscular strength appears intact. No joint effusion, tenderness, swelling or warmth  Neuro:  No focal motor or sensory deficits        ASSESSMENT     Patient Active Problem List    Diagnosis Date Noted    Bipolar disorder, current episode mixed, moderate (HCC)     Hallux valgus, acquired 06/20/2014        Diagnosis:     ICD-10-CM    1. Encounter to establish care  Z76.89 CBC with Auto Differential     Lipid Panel     Vitamin D 25 Hydroxy   2. Bipolar disorder, current episode mixed, moderate ([facility] [address]  F31.62    3.  Lipoma of skin, face  D17.0 DC - Sean Ahser MD, Dermatology, [facility] PLAN:           Patient Instructions   REGULAR DIET  TAKE MEDICATIONS AS PER PSYCHIATRIST  FOR MOOD CONTROL. REGULAR EXERCISE  ADVISED. SEE DERMATOLOGIST AS SCHEDULED. FASTING FOR LAB WORK ONE MORNING. NEXT APPOINTMENT IN 2 MONTHS. Return in about 2 months (around 7/17/2022) for FOLLOW UP VISIT. I have reviewed my findings and recommendations with Paco Navarro.     Electronically signed by Yaritza Pinto MD on 5/17/22 at 2:16 PM EDT

## 2022-05-17 NOTE — PATIENT INSTRUCTIONS
REGULAR DIET  TAKE MEDICATIONS AS PER PSYCHIATRIST  FOR MOOD CONTROL. REGULAR EXERCISE  ADVISED. SEE DERMATOLOGIST AS SCHEDULED. FASTING FOR LAB WORK ONE MORNING. NEXT APPOINTMENT IN 2 MONTHS.

## 2023-06-20 ENCOUNTER — OFFICE VISIT (OUTPATIENT)
Dept: FAMILY MEDICINE CLINIC | Age: 28
End: 2023-06-20
Payer: COMMERCIAL

## 2023-06-20 VITALS
OXYGEN SATURATION: 98 % | SYSTOLIC BLOOD PRESSURE: 118 MMHG | WEIGHT: 173 LBS | HEART RATE: 80 BPM | BODY MASS INDEX: 27.1 KG/M2 | DIASTOLIC BLOOD PRESSURE: 80 MMHG

## 2023-06-20 DIAGNOSIS — M25.531 RIGHT WRIST PAIN: ICD-10-CM

## 2023-06-20 DIAGNOSIS — E78.1 HYPERTRIGLYCERIDEMIA: ICD-10-CM

## 2023-06-20 DIAGNOSIS — N64.52 BREAST DISCHARGE: Primary | ICD-10-CM

## 2023-06-20 DIAGNOSIS — F31.62 BIPOLAR DISORDER, CURRENT EPISODE MIXED, MODERATE (HCC): ICD-10-CM

## 2023-06-20 DIAGNOSIS — E55.9 HYPOVITAMINOSIS D: ICD-10-CM

## 2023-06-20 PROCEDURE — G8419 CALC BMI OUT NRM PARAM NOF/U: HCPCS | Performed by: FAMILY MEDICINE

## 2023-06-20 PROCEDURE — 99214 OFFICE O/P EST MOD 30 MIN: CPT | Performed by: FAMILY MEDICINE

## 2023-06-20 PROCEDURE — 4004F PT TOBACCO SCREEN RCVD TLK: CPT | Performed by: FAMILY MEDICINE

## 2023-06-20 PROCEDURE — G8427 DOCREV CUR MEDS BY ELIG CLIN: HCPCS | Performed by: FAMILY MEDICINE

## 2023-06-20 SDOH — ECONOMIC STABILITY: FOOD INSECURITY: WITHIN THE PAST 12 MONTHS, THE FOOD YOU BOUGHT JUST DIDN'T LAST AND YOU DIDN'T HAVE MONEY TO GET MORE.: NEVER TRUE

## 2023-06-20 SDOH — ECONOMIC STABILITY: FOOD INSECURITY: WITHIN THE PAST 12 MONTHS, YOU WORRIED THAT YOUR FOOD WOULD RUN OUT BEFORE YOU GOT MONEY TO BUY MORE.: NEVER TRUE

## 2023-06-20 SDOH — ECONOMIC STABILITY: INCOME INSECURITY: HOW HARD IS IT FOR YOU TO PAY FOR THE VERY BASICS LIKE FOOD, HOUSING, MEDICAL CARE, AND HEATING?: NOT HARD AT ALL

## 2023-06-20 SDOH — ECONOMIC STABILITY: HOUSING INSECURITY
IN THE LAST 12 MONTHS, WAS THERE A TIME WHEN YOU DID NOT HAVE A STEADY PLACE TO SLEEP OR SLEPT IN A SHELTER (INCLUDING NOW)?: NO

## 2023-06-20 ASSESSMENT — PATIENT HEALTH QUESTIONNAIRE - PHQ9
2. FEELING DOWN, DEPRESSED OR HOPELESS: 0
6. FEELING BAD ABOUT YOURSELF - OR THAT YOU ARE A FAILURE OR HAVE LET YOURSELF OR YOUR FAMILY DOWN: 0
SUM OF ALL RESPONSES TO PHQ QUESTIONS 1-9: 0
7. TROUBLE CONCENTRATING ON THINGS, SUCH AS READING THE NEWSPAPER OR WATCHING TELEVISION: 0
8. MOVING OR SPEAKING SO SLOWLY THAT OTHER PEOPLE COULD HAVE NOTICED. OR THE OPPOSITE, BEING SO FIGETY OR RESTLESS THAT YOU HAVE BEEN MOVING AROUND A LOT MORE THAN USUAL: 0
5. POOR APPETITE OR OVEREATING: 0
4. FEELING TIRED OR HAVING LITTLE ENERGY: 0
SUM OF ALL RESPONSES TO PHQ QUESTIONS 1-9: 0
SUM OF ALL RESPONSES TO PHQ QUESTIONS 1-9: 0
10. IF YOU CHECKED OFF ANY PROBLEMS, HOW DIFFICULT HAVE THESE PROBLEMS MADE IT FOR YOU TO DO YOUR WORK, TAKE CARE OF THINGS AT HOME, OR GET ALONG WITH OTHER PEOPLE: 0
1. LITTLE INTEREST OR PLEASURE IN DOING THINGS: 0
9. THOUGHTS THAT YOU WOULD BE BETTER OFF DEAD, OR OF HURTING YOURSELF: 0
SUM OF ALL RESPONSES TO PHQ9 QUESTIONS 1 & 2: 0
3. TROUBLE FALLING OR STAYING ASLEEP: 0
SUM OF ALL RESPONSES TO PHQ QUESTIONS 1-9: 0

## 2023-06-20 NOTE — PROGRESS NOTES
OFFICE PROGRESS NOTE      SUBJECTIVE:        Patient ID:   Sindy Mohan is a 32 y.o. female who presents for   Chief Complaint   Patient presents with    Breast Problem     discharge           HPI:     [facility] 1 MONTH. NOT PREGNANT AS FELT BY THE PATIENT. PERIODS ARE IRREGULAR SINCE ON THE INVEGA  BY THE PSYCHIATRIST. ALSO FEELS SHE IS GAINING WEIGHT BECAUSE OF THE MEDICATION. MEDICATION REFILL. FEELS GOOD. WATCHING DIET GOOD. WALKING SOME FOR EXERCISE. TAKING MEDICATIONS REGULARLY. Prior to Admission medications    Medication Sig Start Date End Date Taking?  Authorizing Provider   paliperidone palmitate ER (INVEGA SUSTENNA) 156 MG/ML CHRIS IM injection Inject 156 mg into the muscle every 30 days   Yes Historical Provider, MD   naproxen (NAPROSYN) 500 MG tablet 1 tablet, 2 times a day as needed for pain  Patient not taking: Reported on 6/20/2023 6/15/23   Jeferson Hess., APRN - CNP     Social History     Socioeconomic History    Marital status: Single   Tobacco Use    Smoking status: Every Day     Packs/day: 0.50     Years: 0.50     Pack years: 0.25     Types: Cigarettes    Smokeless tobacco: Never   Vaping Use    Vaping Use: Never used   Substance and Sexual Activity    Alcohol use: No    Drug use: No     Social Determinants of Health     Financial Resource Strain: Low Risk     Difficulty of Paying Living Expenses: Not hard at all   Food Insecurity: No Food Insecurity    Worried About Running Out of Food in the Last Year: Never true    Ran Out of Food in the Last Year: Never true   Transportation Needs: Unknown    Lack of Transportation (Non-Medical): No   Housing Stability: Unknown    Unstable Housing in the Last Year: No       I have reviewed Noe's allergies, medications, problem list, medical, social and family history and have updated as needed in the electronic medical record  Review Of Systems:    Skin: no

## 2023-06-20 NOTE — PATIENT INSTRUCTIONS
REGULAR DIET  TAKE MEDICATIONS AS PER PSYCHIATRIST  FOR MOOD CONTROL. REGULAR EXERCISE  ADVISED. SEE GYNECOLOGIST  AS SCHEDULED. FASTING FOR LAB WORK ONE MORNING. NEXT APPOINTMENT IN 1 MONTH.

## 2023-06-21 DIAGNOSIS — E78.1 HYPERTRIGLYCERIDEMIA: ICD-10-CM

## 2023-06-21 DIAGNOSIS — N64.52 BREAST DISCHARGE: ICD-10-CM

## 2023-06-21 DIAGNOSIS — E55.9 HYPOVITAMINOSIS D: ICD-10-CM

## 2023-06-21 LAB
ALBUMIN SERPL-MCNC: 4.6 G/DL (ref 3.5–5.2)
ALP SERPL-CCNC: 49 U/L (ref 35–104)
ALT SERPL-CCNC: 5 U/L (ref 0–32)
ANION GAP SERPL CALCULATED.3IONS-SCNC: 13 MMOL/L (ref 7–16)
AST SERPL-CCNC: 12 U/L (ref 0–31)
BASOPHILS # BLD: 0.02 E9/L (ref 0–0.2)
BASOPHILS NFR BLD: 0.3 % (ref 0–2)
BILIRUB SERPL-MCNC: 0.3 MG/DL (ref 0–1.2)
BUN SERPL-MCNC: 8 MG/DL (ref 6–20)
CALCIUM SERPL-MCNC: 9.5 MG/DL (ref 8.6–10.2)
CHLORIDE SERPL-SCNC: 104 MMOL/L (ref 98–107)
CHOLESTEROL, TOTAL: 141 MG/DL (ref 0–199)
CO2 SERPL-SCNC: 24 MMOL/L (ref 22–29)
CREAT SERPL-MCNC: 0.7 MG/DL (ref 0.5–1)
EOSINOPHIL # BLD: 0.11 E9/L (ref 0.05–0.5)
EOSINOPHIL NFR BLD: 1.7 % (ref 0–6)
ERYTHROCYTE [DISTWIDTH] IN BLOOD BY AUTOMATED COUNT: 12.4 FL (ref 11.5–15)
GLUCOSE SERPL-MCNC: 94 MG/DL (ref 74–99)
HCG SERPL QL: NEGATIVE
HCT VFR BLD AUTO: 41.8 % (ref 34–48)
HDLC SERPL-MCNC: 46 MG/DL
HGB BLD-MCNC: 13.2 G/DL (ref 11.5–15.5)
IMM GRANULOCYTES # BLD: 0.01 E9/L
IMM GRANULOCYTES NFR BLD: 0.2 % (ref 0–5)
LDLC SERPL CALC-MCNC: 84 MG/DL (ref 0–99)
LYMPHOCYTES # BLD: 2.43 E9/L (ref 1.5–4)
LYMPHOCYTES NFR BLD: 38.6 % (ref 20–42)
MCH RBC QN AUTO: 30.1 PG (ref 26–35)
MCHC RBC AUTO-ENTMCNC: 31.6 % (ref 32–34.5)
MCV RBC AUTO: 95.2 FL (ref 80–99.9)
MONOCYTES # BLD: 0.36 E9/L (ref 0.1–0.95)
MONOCYTES NFR BLD: 5.7 % (ref 2–12)
NEUTROPHILS # BLD: 3.37 E9/L (ref 1.8–7.3)
NEUTS SEG NFR BLD: 53.5 % (ref 43–80)
PLATELET # BLD AUTO: 293 E9/L (ref 130–450)
PMV BLD AUTO: 11.2 FL (ref 7–12)
POTASSIUM SERPL-SCNC: 4.8 MMOL/L (ref 3.5–5)
PROT SERPL-MCNC: 7 G/DL (ref 6.4–8.3)
RBC # BLD AUTO: 4.39 E12/L (ref 3.5–5.5)
SODIUM SERPL-SCNC: 141 MMOL/L (ref 132–146)
TRIGL SERPL-MCNC: 56 MG/DL (ref 0–149)
VITAMIN D 25-HYDROXY: 17 NG/ML (ref 30–100)
VLDLC SERPL CALC-MCNC: 11 MG/DL
WBC # BLD: 6.3 E9/L (ref 4.5–11.5)

## 2023-06-22 NOTE — RESULT ENCOUNTER NOTE
VITAMIN D LEVEL TOO  LOW. TAKE VITAMIN D-3 2000 UNITS DAILY. DISCUSS NEXT VISIT. PLEASE ACKNOWLEDGE RECEIPT OF INFORMATION BY REPLYING THE MESSAGE. THANKS.

## 2023-08-08 ENCOUNTER — OFFICE VISIT (OUTPATIENT)
Dept: FAMILY MEDICINE CLINIC | Age: 28
End: 2023-08-08
Payer: COMMERCIAL

## 2023-08-08 VITALS
HEART RATE: 87 BPM | BODY MASS INDEX: 26.31 KG/M2 | WEIGHT: 168 LBS | SYSTOLIC BLOOD PRESSURE: 124 MMHG | DIASTOLIC BLOOD PRESSURE: 80 MMHG | OXYGEN SATURATION: 98 %

## 2023-08-08 DIAGNOSIS — F31.62 BIPOLAR DISORDER, CURRENT EPISODE MIXED, MODERATE (HCC): ICD-10-CM

## 2023-08-08 DIAGNOSIS — E66.3 OVERWEIGHT (BMI 25.0-29.9): Primary | ICD-10-CM

## 2023-08-08 DIAGNOSIS — E55.9 HYPOVITAMINOSIS D: ICD-10-CM

## 2023-08-08 PROCEDURE — 4004F PT TOBACCO SCREEN RCVD TLK: CPT | Performed by: FAMILY MEDICINE

## 2023-08-08 PROCEDURE — G8427 DOCREV CUR MEDS BY ELIG CLIN: HCPCS | Performed by: FAMILY MEDICINE

## 2023-08-08 PROCEDURE — G8419 CALC BMI OUT NRM PARAM NOF/U: HCPCS | Performed by: FAMILY MEDICINE

## 2023-08-08 PROCEDURE — 99214 OFFICE O/P EST MOD 30 MIN: CPT | Performed by: FAMILY MEDICINE

## 2023-08-08 RX ORDER — ERGOCALCIFEROL 1.25 MG/1
50000 CAPSULE ORAL WEEKLY
Qty: 12 CAPSULE | Refills: 1 | Status: SHIPPED | OUTPATIENT
Start: 2023-08-08

## 2023-08-08 NOTE — PROGRESS NOTES
Septum midline. Mucosa normal. No drainage  Oropharynx: Oropharynx clear with no exudate seen  Neck: Neck supple. No jugular venous distension, lymphadenopathy or thyromegaly Trachea midline  Chest:  Normal. Movements are Normal and Equal.  Lungs: Lungs clear to auscultation bilaterally. No ronchi, crackles or wheezes  Heart: S1 S2  Regular rate and rhythm. No rub, murmur or gallop  Abdomen: Abdomen soft, non-tender. BS normal. No masses, organomegaly. Back: Grossly Normal and Equal. DTR are Normal. SLR is Normal.  Extremities: NORMAL. Pedal pulses are normal.  Musculoskeletal: Muscular strength appears intact. No joint effusion, tenderness, swelling or warmth  Neuro:  No focal motor or sensory deficits        ASSESSMENT     Patient Active Problem List    Diagnosis Date Noted    Bipolar disorder, current episode mixed, moderate (HCC)     Hallux valgus, acquired 06/20/2014    Breast discharge 06/20/2023    Hypertriglyceridemia 06/20/2023        Diagnosis:     ICD-10-CM    1. Overweight (BMI 25.0-29. 9)  E66.3     STABLE      2. Bipolar disorder, current episode mixed, moderate ([address])  F31.62     SATBLE      3. Hypovitaminosis D  E55.9     LOW          PLAN:           Patient Instructions   REGULAR DIET  TAKE MEDICATIONS AS PER PSYCHIATRIST  FOR MOOD CONTROL. REGULAR EXERCISE  ADVISED. TAKE RYBELSUS 3 MG. DAILY FOR WEIGHT LOSS. TAKE VITAMIN D-3 93332 UNITS WEEKLY FOR VITAMIN DEFICIENCY. NEXT APPOINTMENT IN 2 MONTHS. Return in about 2 months (around 10/8/2023) for FOLLOW UP VISIT. I have reviewed my findings and recommendations with Tequila Andrew.     Electronically signed by Jelly Elise MD on 8/8/23 at 3:53 PM EDT

## 2023-08-08 NOTE — PATIENT INSTRUCTIONS
REGULAR DIET  TAKE MEDICATIONS AS PER PSYCHIATRIST  FOR MOOD CONTROL. REGULAR EXERCISE  ADVISED. TAKE RYBELSUS 3 MG. DAILY FOR WEIGHT LOSS. TAKE VITAMIN D-3 90025 UNITS WEEKLY FOR VITAMIN DEFICIENCY. NEXT APPOINTMENT IN 2 MONTHS.

## 2023-08-09 ENCOUNTER — TELEPHONE (OUTPATIENT)
Dept: FAMILY MEDICINE CLINIC | Age: 28
End: 2023-08-09

## 2023-11-03 LAB
ALBUMIN SERPL-MCNC: NORMAL G/DL
ALP BLD-CCNC: NORMAL U/L
ALT SERPL-CCNC: NORMAL U/L
ANION GAP SERPL CALCULATED.3IONS-SCNC: NORMAL MMOL/L
AST SERPL-CCNC: NORMAL U/L
BASOPHILS ABSOLUTE: NORMAL
BASOPHILS RELATIVE PERCENT: NORMAL
BILIRUB SERPL-MCNC: NORMAL MG/DL
BUN BLDV-MCNC: NORMAL MG/DL
CALCIUM SERPL-MCNC: NORMAL MG/DL
CHLORIDE BLD-SCNC: NORMAL MMOL/L
CO2: NORMAL
CREAT SERPL-MCNC: NORMAL MG/DL
EGFR: NORMAL
EOSINOPHILS ABSOLUTE: NORMAL
EOSINOPHILS RELATIVE PERCENT: NORMAL
GLUCOSE BLD-MCNC: NORMAL MG/DL
HCT VFR BLD CALC: NORMAL %
HEMOGLOBIN: NORMAL
LYMPHOCYTES ABSOLUTE: NORMAL
LYMPHOCYTES RELATIVE PERCENT: NORMAL
MCH RBC QN AUTO: NORMAL PG
MCHC RBC AUTO-ENTMCNC: NORMAL G/DL
MCV RBC AUTO: NORMAL FL
MONOCYTES ABSOLUTE: NORMAL
MONOCYTES RELATIVE PERCENT: NORMAL
NEUTROPHILS ABSOLUTE: NORMAL
NEUTROPHILS RELATIVE PERCENT: NORMAL
PLATELET # BLD: NORMAL 10*3/UL
PMV BLD AUTO: NORMAL FL
POTASSIUM SERPL-SCNC: NORMAL MMOL/L
RBC # BLD: NORMAL 10*6/UL
SODIUM BLD-SCNC: NORMAL MMOL/L
TOTAL PROTEIN: NORMAL
WBC # BLD: NORMAL 10*3/UL

## 2023-11-15 ENCOUNTER — HOSPITAL ENCOUNTER (OUTPATIENT)
Dept: PSYCHIATRY | Age: 28
Setting detail: THERAPIES SERIES
Discharge: HOME | End: 2023-11-15
Payer: COMMERCIAL

## 2023-11-15 PROCEDURE — 80305 DRUG TEST PRSMV DIR OPT OBS: CPT

## 2023-11-15 PROCEDURE — 90791 PSYCH DIAGNOSTIC EVALUATION: CPT

## 2023-11-15 RX ORDER — LITHIUM CARBONATE 300 MG/1
300 TABLET, FILM COATED, EXTENDED RELEASE ORAL 2 TIMES DAILY
COMMUNITY

## 2023-11-15 ASSESSMENT — ANXIETY QUESTIONNAIRES
1. FEELING NERVOUS, ANXIOUS, OR ON EDGE: 3
IF YOU CHECKED OFF ANY PROBLEMS ON THIS QUESTIONNAIRE, HOW DIFFICULT HAVE THESE PROBLEMS MADE IT FOR YOU TO DO YOUR WORK, TAKE CARE OF THINGS AT HOME, OR GET ALONG WITH OTHER PEOPLE: VERY DIFFICULT
7. FEELING AFRAID AS IF SOMETHING AWFUL MIGHT HAPPEN: 3
4. TROUBLE RELAXING: 0
5. BEING SO RESTLESS THAT IT IS HARD TO SIT STILL: 0
2. NOT BEING ABLE TO STOP OR CONTROL WORRYING: 3
6. BECOMING EASILY ANNOYED OR IRRITABLE: 0
3. WORRYING TOO MUCH ABOUT DIFFERENT THINGS: 1
GAD7 TOTAL SCORE: 10

## 2023-11-15 ASSESSMENT — PATIENT HEALTH QUESTIONNAIRE - PHQ9
1. LITTLE INTEREST OR PLEASURE IN DOING THINGS: 3
2. FEELING DOWN, DEPRESSED OR HOPELESS: 3
SUM OF ALL RESPONSES TO PHQ QUESTIONS 1-9: 19
4. FEELING TIRED OR HAVING LITTLE ENERGY: 3
6. FEELING BAD ABOUT YOURSELF - OR THAT YOU ARE A FAILURE OR HAVE LET YOURSELF OR YOUR FAMILY DOWN: 2
9. THOUGHTS THAT YOU WOULD BE BETTER OFF DEAD, OR OF HURTING YOURSELF: 0
SUM OF ALL RESPONSES TO PHQ QUESTIONS 1-9: 19
SUM OF ALL RESPONSES TO PHQ9 QUESTIONS 1 & 2: 6
5. POOR APPETITE OR OVEREATING: 0
3. TROUBLE FALLING OR STAYING ASLEEP: 2
10. IF YOU CHECKED OFF ANY PROBLEMS, HOW DIFFICULT HAVE THESE PROBLEMS MADE IT FOR YOU TO DO YOUR WORK, TAKE CARE OF THINGS AT HOME, OR GET ALONG WITH OTHER PEOPLE: 3
7. TROUBLE CONCENTRATING ON THINGS, SUCH AS READING THE NEWSPAPER OR WATCHING TELEVISION: 3
8. MOVING OR SPEAKING SO SLOWLY THAT OTHER PEOPLE COULD HAVE NOTICED. OR THE OPPOSITE, BEING SO FIGETY OR RESTLESS THAT YOU HAVE BEEN MOVING AROUND A LOT MORE THAN USUAL: 3

## 2023-11-16 NOTE — PLAN OF CARE
[facility]- Level of Care Placement      [x]Admissions  []Continued Stay []Discharge/Transfer / Complication in [facility] [id number]    Client Sticker      Level of Care Level 1      Outpatient Services Level 2.1   Intensive Outpatient Services(IOP) Level 2.5   Partial Hospitalization Services Level 3.1 CLINICALLY Managed Low-Intensity Residential Services Level 3.3  CLINICALLY Managed Population- Specific High- Intensity Residential Services Level 3.5  CLINICALLY Managed High Intensive Residential Services Level 3.7  MEDICALLY Monitored Intensive Inpatient Services Level 4  MEDICALLY Managed Intensive Inpatient Services   Dimension 1  Acute Intoxication and/or Withdrawal Potential [] Not experiencing significant withdrawal    [] Minimal  risk of severe  withdrawal [x] Minimal  risk of severe  withdrawal    [] Manageable  at Level 2-WM [] Moderate  risk of severe withdrawal    [] Manageable at Level 2-WM [] No withdrawal risk or minimal or stable withdrawal     [] Concurrently receiving Level -WM or Level 2-WM services [] Minimal risk of severe withdrawal    [] If withdrawal is present, manageable at Level 3. 2-WM  [] Minimal risk of severe withdrawal    [] If withdrawal is present manageable at Level 3. 2-WM [] High risk of withdrawal, but manageable at Level  3.7-WM and does not require  full resources of a licensed hospital [] At high risk of withdrawal and requires Level 4-WM and full resources of licensed hospital    COMMENTS:           Dimension 2   Biomedical Conditions and Complications  ([facility]) [] None or very stable    [] Receiving concurrent medical monitoring  [x] None or not a distraction from treatment    [] Problems are manageable at Level 2.1 [] None or not sufficient to distract from treatment    [] Problems are manageable at  Level 2.5 [] None or stable    [] Receiving concurrent medical monitoring  [] None or stable    [] Receiving concurrent medical Neuro Interventional Discharge Instructions  Following your Procedure using Radial (Wrist) Site    Follow-Up & Additional Instructions:  Please follow up with Dr. Oh on 09/08/22, as scheduled.   Please call clinic with any questions or concerns.    Activity: For the next 24 hours  Follow these guidelines related to the sedation medicine that you've received.   You must have someone drive you home.  You may feel tired, unsteady, or not quite yourself for the remainder of the day due to the sedation medicine. Your body gets rid of the medicine usually in 24 hours.  Do not drive or operate machinery or power tools.  Do not drink alcohol or smoke.  Do not make any important decisions or sign important papers.    Activity:   Follow these guidelines related to the puncture site in your wrist.  Minimal use of the arm used for the procedure for the remainder of the day. If possible, elevate your arm on a pillow and don't bend your wrist.  No lifting more than 5 pounds for 5 days with the arm used for the procedure.  If you do heavy physical work on your job, follow your doctor's instructions about when you may return to work.  Use ice pack for discomfort.  If you have a wrist immobilizer, remove the following morning.    Procedure Site Care:  Keep the dressing on your procedure site for the remainder of the day. The following day, you may remove the dressing and shower. Gently cleanse the procedure site with soap and water and a clean wash cloth and pat dry.   No soaking the wrist in water (i.e., bathtub, hot tub, dish water, pool of water) until the wound has completely healed (3-5 days).    Common side effects you may notice  Soreness at puncture site.  Slight bruising at the site for several days to several weeks.  Lump the size of a pea or marble at the puncture site for a few weeks.    Diet/Fluids  Resume diet as prior to admission.  Drink plenty of liquids to help flush the dye used for your procedure out of your  body (unless you're on a fluid restriction ordered by your physician).    Medication  Take mild pain reliever such as Tylenol (acetaminophen) as needed for pain.  The medications that your doctor has prescribed are important for your recovery and long-term care.    If at any time another doctor would like to take you off or change the dose of these medications - Plavix/Brilinta, aspirin or statin (cholesterol lowering medication), call the Nurse Call Line [phone] or your primary care provider to discuss other options.    Labs:  If labs are needed in the future. We ask that you please have them drawn in the morning BEFORE taking or aspirin and/or Plavix for the day.     Increased risk of bleeding (while taking aspirin or Plavix/Brilinta):  Being on antiplatelet medications increases the risk for bleeding. Signs and symptoms of bleeding should be monitored closely while taking these medications together. These include:  Coffee ground emesis (vomit which looks like coffee grounds)   Tarry stools   Nose bleeds   Excessive gum bleeding with teeth brushing   Blood in urine     Call your doctor if you have:  Significant bleeding from the procedure site. If bleeding occurs or there is a growing lump at your site, lie down and apply firm pressure at the site where your dressing is. Call 911 and keep pressure on the site.  Numbness, tingling or color change in the arm used for puncture site.  Increasing pain and firmness near the puncture site.  A temperature greater than 101 degrees.  Redness or drainage around site.    How to schedule your follow up imaging:   You must initiate the call to get imaging scheduled. If you need assistance with this please call the clinic nurse to help you ([phone]).  To schedule your own imaging; please call [phone] Mon-Fri 8am - 430pm.  Alternate scheduling number [phone] Mon-Fri 7am - 8pm, Sat 730am - 4pm.  Please call as soon as possible if your follow up imaging is  needed in the next one month; the test will need to be scheduled before the insurance can approve it.   Delays in scheduling your imaging will delay your clinic appointment.     With any questions or concerns please call office at [phone] M-F 8:00 - 5:00. If after hours and/or holidays please call [phone] for emergent questions or concerns.     SEEK IMMEDIATE MEDICAL ATTENTION OR CALL 911 for any sudden change in neurological or mental status such as:   increased headache  loss of vision  change in speech  loss of movement or sensation on one side of your body  memory problems  or loss of balance      Remember BEFAST: these are the most common signs and symptoms of a stroke. If you experience any of these, call 911 immediately.

## 2023-11-16 NOTE — CARE COORDINATION
DIAGNOSTIC IMPRESSIONS: Substance-Use Disorder (SUB)    Scale: DSM-V Diagnosis Code   No diagnosis                    0-1    Mild HUGH                          2-3    Moderate HUGH                 4-5    Severe HUGH                      6+ F12.20   Other factors: The patient has been using cannabis since age 10. She had one year sober as [place]. She was never in HUGH treatment. She generally uses two blunts per day. She last used two weeks ago. She is recommended for the Dual program and will begin on 11-20-23. She has been seeing Dr. John Ferro for Bipolar since her teen age years. She has a recent therapist from [facility]. She has had several in patient admission at [facility]. She was recently discharged from there. Criteria symptoms   A problematic pattern of substance use leading to clinically significant impairment or distress, as manifested by at least two of the following, occurring within a 12-month period. [x] Taken in larger amounts or over longer time than intended. [x] Persistent desire or unsuccessful efforts to cut down/control use. [x] Great deal of time spent obtaining , using, and recovering from effects. [x] Craving or strong desire to use. [x] Recurrent use resulting in failure to fulfill major role obligations at work; school, or home. [x] Continued use despite persistent or recurrent social/interpersonal problems caused or exacerbated by effects. [] Giving up important social, occupational or recreational activities because of use. [] Recurrent use in situations in which it is physically hazardous. [x] Continued use despite knowledge of persistent or recurrent physical or psychological problem likely caused/exacerbated by use.      [] Tolerance as defined by either:  Need for increased amounts to achieve intoxication or desired effects. Diminished effect with continued use of the same amount.     [x] Withdrawal
there.    Gender  [] Male [x] Female [] Transgender  [] Other    Sexual Orientation    [x] Heterosexual [] Homosexual [] Bisexual [] Other    Suicidal Ideation  [] Reports   [x] Denies    Homicidal Ideation  [] Reports   [x] Denies      Hallucinations/Delusions   [] Reports   [x] Denies     Substance Use/Alcohol Use/Addiction  [x] Reports    [] Denies     Trauma History  [] Reports    [x] Denies     Plan of Care: to attend [facility]    Patient Goal: To live a sober life    Patient PHQ 9 Score: 19  Interpretation of Total Score Depression Severity: 1-4 = Minimal depression, 5-9 = Mild depression, 10-14 = Moderate depression, 15-19 = Moderately severe depression, 20-27 = Severe depression    Preliminary Diagnosis and Criteria: F12.20 and F31.6      If session conducted via telehealth:    This session was conducted via: [] Video [] Telephone  Patient Location:  [] Treatment Center [] Home [] Other  Provider Location: [] Treatment Center [] Home [] Other    Signed: BLANQUITA Guevara, [place]               11/16/2023

## 2023-11-20 ENCOUNTER — HOSPITAL ENCOUNTER (OUTPATIENT)
Dept: PSYCHIATRY | Age: 28
Setting detail: THERAPIES SERIES
Discharge: HOME | End: 2023-11-20

## 2023-11-20 ENCOUNTER — HOSPITAL ENCOUNTER (OUTPATIENT)
Dept: PSYCHIATRY | Age: 28
Setting detail: THERAPIES SERIES
Discharge: HOME | End: 2023-11-20
Payer: COMMERCIAL

## 2023-11-20 PROCEDURE — H0015 ALCOHOL AND/OR DRUG SERVICES: HCPCS

## 2023-11-20 NOTE — GROUP NOTE
Group Therapy Note    Date: 11/20/2023    Group Start Time:  9:00 AM  Group End Time: 10:30 AM  Group Topic: Psychotherapy    [facility]    BLANQUITA Ross, [facility]    Topic: Coping skills and poly-vagal techniques. Mode of intervention: self understanding, imitative behaviors, altruism, instillation of hope, altruism, and universality. Note: Noe noted her family as a support. She has been clean for two weeks from [address] [place] [address]. She noted she regrets using , but the group pointed out that she is currently in treatment which is a sigh of strength. We worked on poly-vagal techniques to help stimulate the parasympathetic nervous system when under stress. She was fairly active as this was her first group. Status after intervention:Improved  Participation level: Active Listener and Interactive  Participation Quality: Appropriate, Attentive, and Sharing  Speech: normal  Thought Process/Content:Logical  Mood/Affect: calm and flat  Self report: None Reported  Response to learning: Able to verbalize current knowledge/experience and Able to verbalize/acknowledge new learning  Discipline Responsible: /Counselor    [x] Check in completed and reviewed. Intervention required.  no       Signature:  BLANQUITA Ross, [place]

## 2023-11-20 NOTE — GROUP NOTE
Group Therapy Note    Date: 11/20/2023    Group Start Time: 10:45 AM  Group End Time: 12:00 PM  Group Topic: Psychoeducation    [facility]    BLANQUITA Bethea, \[facility]\""    Topic: Becoming psychological flexible and responding to [employer] Addiction and Depression education film. Mode of intervention: Catharsis, interpersonal learning, imparting of information, socialization, universality, and group cohesion. Group Therapy Note    Attendees: 5     Note: Amie Poster noted the first part of group becoming psychological flexible helped her to provide balance for herself. Her assessment of the educational film gave her insight as how HUGH effects the family. She became tearful at one point, regretting her substance use and resolved she would not use again. Status after intervention:Improved  Participation level: Active Listener and Interactive  Participation Quality: Appropriate, Attentive, and Sharing  Speech: hesitant  Thought Process/Content:Logical  Mood/Affect: depressed and flat  Self report: None Reported  Response to learning: Able to verbalize current knowledge/experience, Able to verbalize/acknowledge new learning, and Able to retain information  Discipline Responsible: /Counselor    [x] Check in completed and reviewed. Intervention required.  no         Signature:  BLANQUITA Bethea, [place]

## 2023-11-20 NOTE — H&P
PSYCHIATRIC EVALUATION      CHIEF COMPLAINT:  \"I had a bipolar episode. \"    HISTORY OF PRESENT ILLNESS: Lou Michaud is a 25 y.o. female with history of treatment for bipolar I disorder who presents for psychiatric evaluation at [address] [place], [place] [address]Th [facility] as a referral from Dr. Vilma Ventura. Patient accompanied to appointment by mother who corroborates history. Patient currently somewhat loose and disorganized. Patient has been seeing Dr. Rochelle Gold for some time and was maintained on monthly Invega Sustenna 156 mg injections as she was resistant to taking pills. Mother reports a few weeks ago patient became acutely depressed and paranoid in the context of using marijuana. Dr. Rochelle Gold started her on Lithium 300 mg bid at the time which she claims to be compliant with, and patient denies further marijuana use. Mother reports she has been paranoid to use her phone to text and has irrational persecutory beliefs that she is in some type of trouble with authorities. Mother also reports she has made nonsensical statements such as that she was \"aborted\". Patient denies auditory or visual hallucinations. She is not suicidal or homicidal. Mother denies patient having any psychotic symptoms in the absence of mood symptoms and/or substance use. PAST PSYCHIATRIC HISTORY: Diagnosed bipolar around 14. Three hospitalizations for cristina. Previously on Risperdal. No current therapist.    PAST MEDICAL HISTORY: Otherwise healthy female on no medication. ALLERGIES: Patient has no known allergies. FAMILY PSYCHIATRIC HISTORY: Cousin and brother have bipolar. SOCIAL HISTORY: Patient born and raised in [place]. Mother and siblings very support. Father passed when patient was 13. Mother is successful business owner. Patient has two brothers. Patient graduated college with honors. She works as a  for [employer]. Patient lives with her mother. SUBSTANCE ABUSE HISTORY: Occasional marijuana.  No alcohol or other

## 2023-11-21 ENCOUNTER — HOSPITAL ENCOUNTER (OUTPATIENT)
Dept: PSYCHIATRY | Age: 28
Setting detail: THERAPIES SERIES
Discharge: HOME | End: 2023-11-21
Payer: COMMERCIAL

## 2023-11-21 PROCEDURE — 80305 DRUG TEST PRSMV DIR OPT OBS: CPT

## 2023-11-21 PROCEDURE — H0015 ALCOHOL AND/OR DRUG SERVICES: HCPCS

## 2023-11-21 NOTE — GROUP NOTE
Group Therapy Note    Date: 11/21/2023    Group Start Time: 10:45 AM  Group End Time: 12:00 PM  Group Topic: Psychoeducation    [facility]    Topic: Being grateful and diffusing troubling thinking patterns. Mode of intervention: Interpersonal learning, existential factors, self-understanding, socialization, altruism, and instillation of hope. BLANQUITA Man, KEVINW    Note: Greyson Venturamayurisiddhartha noted she has been suffering from a low self esteem as she repressive her thoughts. We worked on cognitive techniques to diffuse troubled thinking patterns by using cognitive applications such as clicking out of problem thinking patterns, screening your thoughts  and filing away your thoughts. She was fairly active in today's group. Status after intervention:Improved  Participation level: Active Listener and Interactive  Participation Quality: Appropriate, Attentive, and Sharing  Speech: normal  Thought Process/Content:Logical  Mood/Affect: brightens and depressed  Self report: None Reported  Response to learning: Able to verbalize current knowledge/experience, Able to verbalize/acknowledge new learning, Able to retain information, Capable of insight, and Able to change behavior  Discipline Responsible: /Counselor    [x] Check in completed and reviewed. Intervention required.  no         Group Therapy Note    Attendees: 10       Signature:  BLANQUITA Man, JULES

## 2023-11-21 NOTE — PLAN OF CARE
Care plan completed on 11-21-23
Client discussed in treatment team today. Present:   Dr. Annalisa Lock      Current Status: Active in the Dual program. Tends to have religous preoccupation, tangential, but is able to refocus enough to participate in class.     Treatment goals:    [] Relapse prevention  [] Increase sober support  [] Increase attendance in sober support groups  [] [place] effectively with cravings and urges  [x] Other:     Recommendation: Contiune in the Dual program.    Electronically signed by BLANQUITA Mathur, LSW on 11/21/2023 at 5:21 PM
available in 24-hour structured setting    [] If there is high severity in this dimension, but not in any other dimension, motivational enhancement strategies should be provided in Level 1 [] Problems in this dimension do not qualify the client for Level 4 services    [] If the client's only severity is in Dimension 4,5, and/or 6 without high severity in Dimensions 1,2, and/or 3, then client is not qualified for Level 4   COMMENTS:           Dimension 5  Relapse, Continued Use or Continued Problem Potential  [] Able to maintain abstinence or control use and/or addictive behaviors  and pursue recovery or motivational goals with minimal support [x] Intensification of addiction or mental health symptoms indicate high likelihood of relapse risk or continued use or continued problems without  close monitoring and support several times/wk.  [] Intensification of addiction or mental health symptoms, despite active participation in a Level 1 or 2.1 program, indicates high likelihood of relapse or continued use or continued problems without near-daily monitoring [] Understands relapse but needs structure to maintain therapeutic gains  [] Has little awareness and needs interventions available only at Level 3.3 to prevent continued use, with imminent dangerous consequences, because of cognitive deficits or  comparable dysfunction  [] Has no recognition  of the skills needed to prevent continued use,  with imminently dangerous  consequences [] Unable to control use, with imminently dangerous consequences,  despite active participation at less intensive levels of care [] Problems in this dimension do not qualify the client for Level 4 services    [] If the client's only severity is in Dimension 4,5, and/or 6 without high severity in Dimensions 1,2, and/or 3, then client is not qualified for Level 4   COMMENTS:           Dimension 6  Recovery/Living Environment [x]  Recovery environment is supportive and/or the client has skills
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

## 2023-11-21 NOTE — GROUP NOTE
Group Therapy Note    Date: 11/21/2023    Group Start Time:  9:00 AM  Group End Time: 10:30 AM  Group Topic: Psychotherapy    [facility]    BLANQUITA Arroyo, LSW    Topic: Daily check in and Ellis session one workbook  Mode of intervention: self understanding, universality, socialization, catharsis, existential factors, and Altruism. Group Therapy Note    Attendees: 3       Patient's Goal:  To maintain sobriety    Note: Noe did her daily check in. She verbalized she has been clean from [facility] [address] for close to 3 weeks. She denied having cravings, She notes low self esteem. And peer pressure as why she used in part. She no longer associates with those peers. She regrets her past use and is focusing on being clean. She has a positive relationship with her mother. Her family is supportive of her sobriety. She noted IOP as also a support for her. She currently lives with her sister. She hopes to maintain her sobriety and eventually get back to work. She has lost her self worth now that she is not working. She became tearful at one point. She was religiously preoccupied. She is looking forward for Thanksgiving with her family She also participated in the terms and definitions of [place] work book # 1. Status after intervention:Improved  Participation level: Active Listener and Interactive  Participation Quality: Appropriate, Attentive, and Sharing  Speech: normal  Thought Process/Content:Logical  Mood/Affect: brightens, anxious, flat, and tearful  Self report: None Reported  Response to learning: Able to verbalize current knowledge/experience, Able to verbalize/acknowledge new learning, Able to retain information, Capable of insight, and Able to change behavior  Discipline Responsible: /Counselor    [x] Check in completed and reviewed. Intervention required.  no       Signature:  BLANQUITA Arroyo, [address] [place] [address]

## 2023-11-22 ENCOUNTER — HOSPITAL ENCOUNTER (OUTPATIENT)
Dept: PSYCHIATRY | Age: 28
Setting detail: THERAPIES SERIES
Discharge: HOME | End: 2023-11-22
Payer: COMMERCIAL

## 2023-11-22 PROCEDURE — H0015 ALCOHOL AND/OR DRUG SERVICES: HCPCS

## 2023-11-22 NOTE — GROUP NOTE
Group Therapy Note    Date: 11/22/2023    Group Start Time: 10:45 AM  Group End Time: 12:00 PM  Group Topic: Psychoeducation    [facility]    BLANQUITA Redding, [facility]    Topic: Acknowledging gratitude and recreational group related to mindfulness under stressful conditions. Mode of Intervention: self understanding, interpersonal learning, group cohesion, altruism, instillation of hope, and imparting of information. Note: Maximo noted she is grateful for her family and that she will be celebrating Thanksgiving with her family in [place]. She responded well to the to the recreational group activity which artificially produced stress to see how the group would responded. She was active in graduating a peer from the [facility] program.    Status after intervention:Improved  Participation level: Active Listener and Interactive  Participation Quality: Appropriate, Attentive, Sharing, and Supportive  Speech: normal  Thought Process/Content:Logical  Mood/Affect: brightens, calm, and congruent  Self report: None Reported  Response to learning: Able to verbalize current knowledge/experience, Able to verbalize/acknowledge new learning, Able to retain information, Capable of insight, and Able to change behavior  Discipline Responsible: /Counselor    [x] Check in completed and reviewed. Intervention required.  no         Signature:  BLANQUITA Redding, [place]

## 2023-11-22 NOTE — GROUP NOTE
Group Therapy Note    Date: 11/22/2023    Group Start Time:  9:00 AM  Group End Time: 10:30 AM  Group Topic: Psychotherapy    [facility]    BLANQUITA Lobo, [facility]    Topic: Daily check in and self disclosure. Mode of Intervention: universality, Altruism, Instillation of hope, Imparting of information, existential factors, and self understanding. Note: Noe was the most active member in group. She noted she has not been feeling herself and would like to feel her self in the future. She feels her family overwhelms her due their enmeshment issues. SW reviewed the importance of attending sober meetings. Her mother expressed no interest in this as her mother drives. However, SW presented her with information on JOSE a support group which her and her mother could both attend. She plans to speak with mother on the above. She verbalized her out patient psychiatrist notified her if she has another [employer] event that he will no longer treat her. She is depressed with a constricted affect. But she brightens when called upon. She sees herself as a failure, but there is plenty of positives in her life as the group noted. In yesterday's group Hippa and group rules were reviewed. Status after intervention:Improved  Participation level: Active Listener and Interactive  Participation Quality: Appropriate, Attentive, and Sharing  Speech: normal  Thought Process/Content:Logical  Mood/Affect: sad , brightens, depressed, and tearful  Self report: None Reported  Response to learning: Able to verbalize current knowledge/experience, Able to verbalize/acknowledge new learning, Able to retain information, and Capable of insight  Discipline Responsible: /Counselor    [x] Check in completed and reviewed. Intervention required.  no         Signature:  BLANQUITA Lobo, [place]

## 2023-11-28 ENCOUNTER — HOSPITAL ENCOUNTER (OUTPATIENT)
Dept: PSYCHIATRY | Age: 28
Setting detail: THERAPIES SERIES
Discharge: HOME | End: 2023-11-28
Payer: COMMERCIAL

## 2023-11-28 PROCEDURE — 80305 DRUG TEST PRSMV DIR OPT OBS: CPT

## 2023-11-28 PROCEDURE — H0015 ALCOHOL AND/OR DRUG SERVICES: HCPCS

## 2023-11-28 NOTE — PROGRESS NOTES
Spiritual Support Group Note    Number of Participants in Group:      9                  Time: 10:00 am    Goal: Relief from isolation and loneliness             Aurelia Sharing             Self-understanding and gain insight              Acceptance and belonging            Recognize they are not alone                Socialization             Empowerment       Encouragement    Topic:  [] Spiritual Wellness and Self Care                  [] Hope                     [] Connecting with Divine/Others        [] Thankfulness and Gratitude               []  Meaningfulness and Purpose               [x] Forgiveness               [] Peace               [] Connect to [employer]      [] Other    Participation Level:   [x] Active Listener   [] Minimal   [] Monopolizing   [] Interactive   [] No Participation   []  Other:     Attention:   [x] Alert   [] Distractible   [] Drowsy   [] Poor   [] Other:    Manner:   [x] Cooperative   [] Suspicious   [] Withdrawn   [] Guarded   [] Irritable   [] Inhospitable   [] Other:     Others Comments from Group:

## 2023-11-28 NOTE — GROUP NOTE
Group Therapy Note    Date: 11/28/2023    Group Start Time:  9:00 AM  Group End Time: 12:00 PM  Group Topic: Psychotherapy    [facility]    Jose Nava MSW, [facility]    Topic: Daily check in and focus on depression, anxiety and trauma. Mode of intervention: socialization, self understanding, catharsis, imparting of information, group cohesion, and universality. Note: Noe noted she is depressed as she has no meaning in life. However the group was impressed that she earned a 4 year degree and has been important as a  for the past few years. She noted anhedonia. Her family tends to over exerted influence upon her. She believes since she return back to work her life will once again fall into place. Her current THC level is now negative. Status after intervention:Improved  Participation level: Active Listener and Interactive  Participation Quality: Appropriate, Attentive, and Sharing  Speech: normal  Thought Process/Content:Logical  Mood/Affect: depressed  Self report: None Reported  Response to learning: Able to verbalize current knowledge/experience, Able to verbalize/acknowledge new learning, and Able to retain information  Discipline Responsible: /Counselor    [] Check in completed and reviewed. Intervention required.  no       Signature:  BLANQUITA Banda, [place]

## 2023-11-28 NOTE — GROUP NOTE
Group Therapy Note    Date: 11/28/2023    Group Start Time: 10:45 AM  Group End Time: 12:00 PM  Group Topic: Psychoeducation    [facility]    BLANQUITA Gutiérrez, LSW    Topic: Spiritual Care: Forgiving my self co-lead with spiritual care. Mode of intervention: understanding of self, catharsis, universality, group cohesion, altruism, and imparting of information. Note: Noe noted the importance of love for self and love for others as important for a healthy life style. Status after intervention:Improved  Participation level: Active Listener and Interactive  Participation Quality: Appropriate, Attentive, and Sharing  Speech: normal  Thought Process/Content:Logical  Mood/Affect: calm and congruent  Self report: None Reported  Response to learning: Able to verbalize current knowledge/experience, Able to verbalize/acknowledge new learning, Able to retain information, Capable of insight, and Able to change behavior  Discipline Responsible: /Counselor    [x] Check in completed and reviewed. Intervention required.  no       Signature:  BLANQUITA Gutiérrez, [facility]

## 2023-11-30 ENCOUNTER — HOSPITAL ENCOUNTER (OUTPATIENT)
Dept: PSYCHIATRY | Age: 28
Setting detail: THERAPIES SERIES
Discharge: HOME | End: 2023-11-30
Payer: COMMERCIAL

## 2023-11-30 PROCEDURE — H0015 ALCOHOL AND/OR DRUG SERVICES: HCPCS

## 2023-11-30 NOTE — GROUP NOTE
Group Therapy Note    Date: 11/30/2023    Group Start Time:  9:00 AM  Group End Time: 10:30 AM  Group Topic: Psychotherapy    [facility]    BLANQUITA Steen LSW    Topic: daily check in and focusing on being appreciated and understood. Mode of intervention: existential factors, understanding of self, group cohesion, altruism, universality, and catharsis. Group Therapy Note    Attendees: 6     Note: Jh Mead noted when she began treatment her depression was a 5 now her depression is a 1. She feels undervalued and and has  not been reaching out to friends. She tends to isolate and distance herself from others. She doesn't fell herself. She is upset with her life. But has has some plans to to help herself, she wants to attend the [facility] and to return going to the local gym. She has been clean from [facility] [address] for 30 days and her last USD was negative. She was fairly active in group and did not appear responding to internal stimuli. SW met with her after group to review her treatment plan. She has no car and her mother refuses to take her to sober meetings. Mother noted in recent conversation she wants to Protect her daughters anonymity. Status after intervention:Improved  Participation level: Active Listener and Interactive  Participation Quality: Appropriate, Attentive, and Sharing  Speech: normal  Thought Process/Content:Logical  Mood/Affect: sad , brightens, and anxious  Self report: None Reported  Response to learning: Able to verbalize current knowledge/experience, Able to verbalize/acknowledge new learning, and Able to retain information  Discipline Responsible: /Counselor    [x] Check in completed and reviewed. Intervention required.  No    Electronically signed by BLANQUITA Steen LSW on 11/30/2023 at 3:30 PM

## 2023-11-30 NOTE — FLOWSHEET NOTE
Patient's mother called. She clarified that the patient,s outpatient psychiatrist will discharge her if she becomes non compliant with her lithium again. She noted the doctor was planning to eventually D/C her from [facility] as her psychosis has been acute. But he has been treating her for Bipolar for 13 years. Mother does give the patient her Lithium. Also mother noted the patient will not use her cell phone, apparently having some type of disturbance by using it. The patient is not back to driving and uses a uber or mother drives her to [employer]. The patient continues to see an out patient therapist once per week. Mother noted the patient thinks she has ruined her life. The patient's UDS has been clean and the patient noted a difference in her cognitive functioning since she stopped MJ use. The patient is to see Dr. Chino Agarwal this coming Monday.     Electronically signed by BLANQUITA Welch LSW on 11/30/2023 at 2:49 PM

## 2023-11-30 NOTE — GROUP NOTE
Group Therapy Note    Date: 11/30/2023    Group Start Time: 10:45 AM  Group End Time: 12:00 PM  Group Topic: Psychotherapy    [facility]    BLANQUITA Cook, \[facility] [place] [facility]\""    Topic: Using coping skills and taking charge to regain control of your life. Mode of intervention: existential factors, understanding of self, group cohesion, altruism, universality, and catharsis. Note: Noe noted she relies on her higher power to help her through stressful situations. She noted she enjoys walking, music and coloring. She is focused to put effort into eating better, cleaning her bedroom, and her health. She noted MJ was a major disruptive force in her life and she wants to take back control of her life. Status after intervention:Improved  Participation level: Active Listener and Interactive  Participation Quality: Appropriate, Attentive, and Sharing  Speech: normal  Thought Process/Content:Logical  Mood/Affect: sad , brightens, and anxious  Self report: None Reported  Response to learning: Able to verbalize current knowledge/experience, Able to verbalize/acknowledge new learning, Able to retain information, and Capable of insight  Discipline Responsible: /Counselor    [x] Check in completed and reviewed. Intervention required.  no       Signature:  BLANQUITA Cook, [place]

## 2023-12-04 ENCOUNTER — HOSPITAL ENCOUNTER (OUTPATIENT)
Dept: PSYCHIATRY | Age: 28
Setting detail: THERAPIES SERIES
Discharge: HOME | End: 2023-12-04
Payer: COMMERCIAL

## 2023-12-04 PROCEDURE — 99214 OFFICE O/P EST MOD 30 MIN: CPT | Performed by: PSYCHIATRY & NEUROLOGY

## 2023-12-04 PROCEDURE — 90837 PSYTX W PT 60 MINUTES: CPT

## 2023-12-04 NOTE — PROGRESS NOTES
Individual Therapy note    Date: 12-4-23  Start time: 9:00  End time: 10:00    Patient's goal: To maintain sobriety      Note: Alise Sosa noted her life has not turned out as she has planned. She noted her self-esteem has been low. She misses her work. She plans to return to work in January. We focused on once she goes back to work her routine she will readjust. Her mother and sister continues to be supportive of her  recovery. Her UDS tested negative for MJ. She is to see the doctor later today. She hasn't been using her cell phone as having paranoid thoughts by doing so. Her family continues to drive her to and from. She was causally dressed, fairly well groomed, quiet affect which brightens, mood is somber. Status after intervention:Improved  Participation level: Active Listener and Interactive  Participation Quality: Appropriate, Attentive, and Sharing  Speech: normal  Thought Process/Content:Logical  Mood/Affect: brightens, calm, and quiet  Self report: None Reported  Response to learning: Able to verbalize current knowledge/experience, Able to verbalize/acknowledge new learning, Able to retain information, Capable of insight, Able to change behavior, and Progressing to goal  Discipline Responsible: /Counselor    [x] Check in completed and reviewed. Intervention required.  no

## 2023-12-05 ENCOUNTER — HOSPITAL ENCOUNTER (OUTPATIENT)
Dept: PSYCHIATRY | Age: 28
Setting detail: THERAPIES SERIES
Discharge: HOME | End: 2023-12-05
Payer: COMMERCIAL

## 2023-12-05 PROCEDURE — H0015 ALCOHOL AND/OR DRUG SERVICES: HCPCS

## 2023-12-05 NOTE — GROUP NOTE
Group Therapy Note    Date: 12/5/2023    Group Start Time:  9:00 AM  Group End Time: 10:30 AM  Group Topic: Psychotherapy    [facility]    BLANQUITA Mota, [facility]    Topic: Daily check in and personal stressors  Mode of Intervention: existential factors, self understanding, group cohesion, altruism, universality, instillation of hope. Note: Noe noted she tends to over sleep as an ineffective coping skill. She is not as active as she would like. She has not returned to driving and isolates herself from her friends. She likewise watches TV. She has been less active than prior to treatment. She likewise she experiences anergia. The group encouraged her to begin to make gradual changes in her life style be slowly becoming more active. She is to resume her job after the holidays. Status after intervention:Unchanged  Participation level: Active Listener and Interactive  Participation Quality: Appropriate, Attentive, and Sharing  Speech: normal  Thought Process/Content:Logical  Mood/Affect: sad  and depressed  Self report: None Reported  Response to learning: Able to verbalize current knowledge/experience, Able to verbalize/acknowledge new learning, Able to retain information, Capable of insight, and Able to change behavior  Discipline Responsible: /Counselor    [x] Check in completed and reviewed. Intervention required.  no       Signature:  BLANQUITA Mota, [place]

## 2023-12-05 NOTE — GROUP NOTE
Group Therapy Note    Date: 12/5/2023    Group Start Time: 10:45 AM  Group End Time: 12:00 PM  Group Topic: Psychoeducation    [facility]    BLANQUITA Varela [facility]    Topic: Focusing on the things we can control  Mode of intervention: Interpersonal learning, understanding of self, group cohesion, socialization, imparting information and instillation of hope. Note: Noe verbalized she wants to take more control of her life rather than being passive. The groups focus was to help ourselves become more independent in circumstances where this is applicable. There are other times where we would need to depend on other people. Status after intervention:Improved  Participation level: Active Listener and Interactive  Participation Quality: Appropriate  Speech: normal  Thought Process/Content:Logical  Mood/Affect: brightens and depressed  Self report: None Reported  Response to learning: Able to verbalize current knowledge/experience, Able to verbalize/acknowledge new learning, Able to retain information, and Capable of insight  Discipline Responsible: /Counselor    [x] Check in completed and reviewed. Intervention required.  no            Signature:  BLANQUITA Varela [place]

## 2023-12-06 NOTE — PLAN OF CARE
[facility]- Level of Care Placement      []Admissions  [x]Continued Stay []Discharge/Transfer / Complication in [id number]    Client Sticker      Level of Care Level 1      Outpatient Services Level 2.1   Intensive Outpatient Services(IOP) Level 2.5   Partial Hospitalization Services Level 3.1 CLINICALLY Managed Low-Intensity Residential Services Level 3.3  CLINICALLY Managed Population- Specific High- Intensity Residential Services Level 3.5  CLINICALLY Managed High Intensive Residential Services Level 3.7  MEDICALLY Monitored Intensive Inpatient Services Level 4  MEDICALLY Managed Intensive Inpatient Services   Dimension 1  Acute Intoxication and/or Withdrawal Potential [x] Not experiencing significant withdrawal    [] Minimal  risk of severe  withdrawal [] Minimal  risk of severe  withdrawal    [] Manageable  at Level 2-WM [] Moderate  risk of severe withdrawal    [] Manageable at Level 2-WM [] No withdrawal risk or minimal or stable withdrawal     [] Concurrently receiving Level -WM or Level 2-WM services [] Minimal risk of severe withdrawal    [] If withdrawal is present, manageable at Level 3. 2-WM  [] Minimal risk of severe withdrawal    [] If withdrawal is present manageable at Level 3. 2-WM [] High risk of withdrawal, but manageable at Level  3.7-WM and does not require  full resources of a licensed hospital [] At high risk of withdrawal and requires Level 4-WM and full resources of licensed hospital    COMMENTS:           Dimension 2   Biomedical Conditions and Complications  (BMC/C) [x] None or very stable    [] Receiving concurrent medical monitoring  [] None or not a distraction from treatment    [] Problems are manageable at Level 2.1 [] None or not sufficient to distract from treatment    [] Problems are manageable at  Level 2.5 [] None or stable    [] Receiving concurrent medical monitoring  [] None or stable    [] Receiving concurrent medical

## 2023-12-06 NOTE — CARE COORDINATION
Client discussed in treatment team today. [facility] [address] [facility] Date: 12-5-23        Current Status: Her attendance has been consistent. Her UDS have been negative. She has been recovering partially from her delusional thinking since she stopped MJ use. She needs to challenge herself to isolate less and engage with the community and others. Treatment goals:    [] Relapse prevention  [] Increase sober support  [] Increase attendance in sober support groups  [] [place] effectively with cravings and urges  [x] Other: To engage in assertive behaviors. Recommendation: Continue with Dual IOP.       Electronically signed by BLANQUITA Andre, JULES on 12/6/2023 at 11:48 AM

## 2023-12-07 ENCOUNTER — HOSPITAL ENCOUNTER (OUTPATIENT)
Dept: PSYCHIATRY | Age: 28
Setting detail: THERAPIES SERIES
Discharge: HOME | End: 2023-12-07
Payer: COMMERCIAL

## 2023-12-07 PROCEDURE — H0015 ALCOHOL AND/OR DRUG SERVICES: HCPCS

## 2023-12-07 PROCEDURE — 80305 DRUG TEST PRSMV DIR OPT OBS: CPT

## 2023-12-07 NOTE — GROUP NOTE
Group Therapy Note    Date: 12/7/2023    Group Start Time:  9:00 AM  Group End Time: 10:30 AM  Group Topic: Psychotherapy    [facility]    Marquis Agustin Nava MSW, [facility]      Topic: Daily check in coping with substances and mental health  Mode of intervention: self understanding, socialization, group cohesion, instillation of hope, imparting of information, and existential factors. Group Therapy Note    Attendees: 8     Note: Irene Rdz has a tendency to isolate and has been slowly moving from isolation to activity. Her and her mother went to a fitness gym yesterday. She plans to continue increasing her daily activities. Her affect was brighter today as she smiled often in group. She had a good rapport with the group as they were helpful in supporting her. She wants to establish more and gradual separation from her family. She thinks of herself of the Black sheep of the family. She recalled her brother who had been an opoid user passing away. We went over her treatment plan after group. Her drug screens have been testing negative. She feels more grounded since she stopped using. She continues to work on improving her self esteem. She was active in group today. Status after intervention:Improved  Participation level: Active Listener and Interactive  Participation Quality: Appropriate, Attentive, and Sharing  Speech: normal  Thought Process/Content:Logical  Mood/Affect: sad  and brightens  Self report: None Reported  Response to learning: Able to verbalize current knowledge/experience, Able to verbalize/acknowledge new learning, Able to retain information, and Capable of insight  Discipline Responsible: /Counselor    [x] Check in completed and reviewed. Intervention required.  no       Signature:  BLANQUITA Varela, [place]

## 2023-12-07 NOTE — GROUP NOTE
Group Therapy Note    Date: 12/7/2023    Group Start Time: 10:45 AM  Group End Time: 12:00 PM  Group Topic: Psychoeducation    [facility]    BLANQUITA Salgado, \[facility]\""    Topic: Improving self esteem    Mode of intervention: self understanding, socializing, imparting information, altruism, instillation of hope, and existential factors. Note: Noe noted her strong points are being creative, enjoying IOP, bree positive, enjoying cooking and being caring and a good listener. SW reviewed her care plan after the group. She is interested in improving her self esteem. Status after intervention:Improved  Participation level: Active Listener and Interactive  Participation Quality: Appropriate, Attentive, and Sharing  Speech: normal  Thought Process/Content:Logical  Mood/Affect: sad  and brightens  Self report: None Reported  Response to learning: Able to verbalize current knowledge/experience, Able to verbalize/acknowledge new learning, Able to retain information, and Capable of insight  Discipline Responsible: /Counselor    [x] Check in completed and reviewed. Intervention required.  no       Signature:  BLANQUITA Salgado, [place]

## 2023-12-11 ENCOUNTER — HOSPITAL ENCOUNTER (OUTPATIENT)
Dept: PSYCHIATRY | Age: 28
Setting detail: THERAPIES SERIES
Discharge: HOME | End: 2023-12-11
Payer: COMMERCIAL

## 2023-12-11 NOTE — FLOWSHEET NOTE
Mother called, CYNDIE in chart. She noted the patient's paranoia has been exacerbated and doesn't think she would benefit by group today. They have to see her out patient psychiatrist tomorrow. She will call back with the his determination.       Electronically signed by BLANQUITA Mendoza, JULES on 12/11/2023 at 8:52 AM

## 2023-12-12 ENCOUNTER — HOSPITAL ENCOUNTER (OUTPATIENT)
Dept: PSYCHIATRY | Age: 28
Setting detail: THERAPIES SERIES
Discharge: HOME | End: 2023-12-12
Payer: COMMERCIAL

## 2023-12-12 NOTE — FLOWSHEET NOTE
Kristy Burns is excused from group today.       Electronically signed by BLANQUITA Mota, JULES on 12/12/2023 at 3:42 PM

## 2023-12-26 ENCOUNTER — HOSPITAL ENCOUNTER (OUTPATIENT)
Dept: PSYCHIATRY | Age: 28
Setting detail: THERAPIES SERIES
Discharge: HOME | End: 2023-12-26
Payer: COMMERCIAL

## 2023-12-26 PROCEDURE — H0015 ALCOHOL AND/OR DRUG SERVICES: HCPCS

## 2023-12-26 PROCEDURE — 80305 DRUG TEST PRSMV DIR OPT OBS: CPT

## 2023-12-26 NOTE — GROUP NOTE
Group Therapy Note    Date: 12/26/2023    Group Start Time:  9:00 AM  Group End Time: 10:30 AM  Group Topic: Psychotherapy    Topic: Daily check in and report after [place] weekend. Mode of intervention: socialization, altruism, imparting of information, group cohesion, instillations of hope, and self understanding. Note: Noe is seeking self reliance without MJ use. She has brightened and has clearer thoughts since she stopped using. Her paranoia has diminished. She had a positive [place] with her family. She reports having cravings, but the group directed her that eventually the cravings will dissipate. She thinks in the distant future that she may return yo MJ use moderately, but the group firmly noted this will trigger her back into paranoid thoughts. She noted perhaps she can share drinks with co-workers as well. The group noted this to be cross using and would trigger more extensive use. Status after intervention:Unchanged  Participation level: Active Listener and Interactive  Participation Quality: Appropriate, Attentive, and Sharing  Speech: normal  Thought Process/Content:Logical  Mood/Affect: spontaneous  Self report: None Reported  Response to learning: Able to verbalize current knowledge/experience, Able to verbalize/acknowledge new learning, and Able to retain information  Discipline Responsible: /Counselor    [x] Check in completed and reviewed. Intervention required.  no       Signature:  BLANQUITA Clark, [place]

## 2023-12-26 NOTE — GROUP NOTE
Group Therapy Note    Date: 12/26/2023    Group Start Time: 10:45 AM  Group End Time: 12:00 PM  Group Topic: Psychoeducation    [facility]    BLANQUITA Varela, [facility] [place]    Topic: Mind set and fixed mindset: giving permission to self for mistakes. Mode of intervention:  self understanding. Interpersonal learning, socialization, altruism, imparting of information, universality and instillation of hope. Note: Noe noted she wants to work on wellness, including eating right and going to the gym. 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 Status after intervention:Unchanged  Participation level: Active Listener and Interactive  Participation Quality: Appropriate, Attentive, and Sharing  Speech: normal  Thought Process/Content:Logical  Mood/Affect: calm and congruent  Self report: None Reported  Response to learning: Able to verbalize current knowledge/experience, Able to verbalize/acknowledge new learning, and Able to retain information  Discipline Responsible: /Counselor    [x] Check in completed and reviewed. Intervention required.  no       Signature:  BLANQUITA Varela, [place]

## 2023-12-28 ENCOUNTER — HOSPITAL ENCOUNTER (OUTPATIENT)
Dept: PSYCHIATRY | Age: 28
Setting detail: THERAPIES SERIES
Discharge: HOME | End: 2023-12-28
Payer: COMMERCIAL

## 2023-12-28 PROCEDURE — H0015 ALCOHOL AND/OR DRUG SERVICES: HCPCS

## 2023-12-28 NOTE — GROUP NOTE
Group Therapy Note    Date: 12/28/2023    Group Start Time:  9:00 AM  Group End Time: 10:30 AM  Group Topic: Psychotherapy    [facility]    BLANQUITA Corea, [facility]    Topic: Daily Check in with self disclosure  Mode of intervention: self understanding, universality, altruism, socialization, instillation of hope, and imparting of information. Note: Noe noted she has been watching TV. But she would like to do more with her life, but her mother has been discouraging from doing so. She plans to return to work in January, to drive, to go to the gym on her own and to eventually move from her mother's home. She feels that her mother is restricting her by being overprotective. She does have aspirations to become a TV . She did emphasis that she has no intention to use and wants to focus on her future goals. Status after intervention:Logical  Participation level: Active Listener and Interactive  Participation Quality: Appropriate, Attentive, and Sharing  Speech: normal  Thought Process/Content:Logical  Mood/Affect: anxious  Self report: None Reported  Response to learning: Able to verbalize current knowledge/experience, Able to verbalize/acknowledge new learning, Able to retain information, and Capable of insight  Discipline Responsible: /Counselor    [x] Check in completed and reviewed. Intervention required.  no       Signature:  BLANQUITA Corea, [place]

## 2023-12-28 NOTE — GROUP NOTE
Group Therapy Note      Date: 12/28/2023     Group Start Time: 10:45 AM  Group End Time: 12:00 PM  Group Topic: Psychoeducation     [facility]     BLANQUITA Harris, KEVINW     Number of participants:   9  Type of group: Psychoeducation  Mode of intervention: Education, Support, Socialization, Exploration, Clarifying, Problem-solving, and Activity  Topic: Managing Anxiety     Objective: To learn how to mange anxiety through relaxation, breathing, grounding and muscle relaxations techniques. Group Therapy Note:     Client actively participated in the group session and she was able to incorporate and understand the importance of relaxation techniques in order to reduce anxiety, along with listening to meditation music. Pt identified that her pulse rate was higher prior to doing these exercises. She acknowledges and has set goal to continue to practice these techniques. Status After Intervention:  Improved    Participation Level: Active Listener and Interactive    Participation Quality: Appropriate, Attentive, Sharing, and Supportive      Speech:  normal      Thought Process/Content: Logical      Affective Functioning: Congruent      Mood: anxious      Level of consciousness:  Alert      Response to Learning: Able to verbalize current knowledge/experience, Able to verbalize/acknowledge new learning, Able to retain information, and Capable of insight      Endings: None Reported    Modes of Intervention: Education, Support, Socialization, Exploration, Clarifying, and Problem-solving      Discipline Responsible: /Counselor      Signature:  BLANQUITA Harris, JULES    [x] Check in completed and reviewed. Intervention required.  no

## 2024-01-02 ENCOUNTER — HOSPITAL ENCOUNTER (OUTPATIENT)
Dept: PSYCHIATRY | Age: 29
Discharge: HOME | End: 2024-01-02

## 2024-01-02 ENCOUNTER — HOSPITAL ENCOUNTER (OUTPATIENT)
Dept: PSYCHIATRY | Age: 29
Setting detail: THERAPIES SERIES
Discharge: HOME | End: 2024-01-02
Payer: COMMERCIAL

## 2024-01-02 PROCEDURE — 99214 OFFICE O/P EST MOD 30 MIN: CPT | Performed by: PSYCHIATRY & NEUROLOGY

## 2024-01-02 PROCEDURE — H0015 ALCOHOL AND/OR DRUG SERVICES: HCPCS

## 2024-01-02 NOTE — PROGRESS NOTES
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 Prozac increased to 20 mg last week. Met with patient to discuss progress with treatment.     Brighter, more appropriate  No further loosening of thoughts  Insightful about recent paranoia which has dissipated  A little apprehensive but wants to return to work next week  Plans to continue with Dr. Urena for medications  Compliant with oral Lithium and Prozac  Considering changing from [facility] to [facility] for therapy  Again discussed need to avoid marijuana entirely  No acute issues or concerns    MSE: Female appears age. Well-groomed. Pleasant, cooperative, forthcoming. Normal psychomotor activity. Gait, strength, tone normal. Fair eye contact. Mood euthymic. Flexible affect. Speech clear. Thought process organized without loosening of associations or flight of ideas. Content void of paranoia or persecutory delusions. Does not appear internally stimulated. No suicidal or homicidal ideations or behaviors. Orientation, concentration, recent and remote memory are grossly intact. Fund of knowledge fair. Language use fair. Insight and judgment improved.    MEDICATIONS:      Invega Sustenna 156 mg IM monthly (cont)  Litihium 450 mg bid (cont)  Prozac 20 mg daily (cont)    ASSESSMENT:      Bipolar I DIsorder, Depressed with Psychotic Features - stable  Cannabis Use Disorder                 Substance-Induced Psychosis - resolved     PLAN: Continue IOP and current medications. Patient appears to be approaching baseline. Tentative discharge for 1/4/24 as patient returning to work next week. Discuss with team. Follow-up with Dr. Urena as scheduled.                           Electronically signed by Dario Luo MD on 1/2/2024 at 11:48 AM

## 2024-01-02 NOTE — GROUP NOTE
Group Therapy Note    Date: 1/2/2024    Group Start Time:  8:45 AM  Group End Time: 12:00 PM  Group Topic: Psychotherapy    [facility]    Rohit Robin MSW, KEVINW    Topic: Daily check in and review of the weekend.  Mode of Intervention: self understanding, interpersonal learning, altruism, universality, instillation of hope,and socialization.      Group Therapy Note    Attendees: 2     Note: Edward was active in group. She is planning to return to work on January 11th and this my be her last week in treatment. She has not been involved in sober meetings and has no sponsor. She was referred to [facility] group, but is hesitent as this is maybe a potential furture employer. She was encourgaed to attend zoom meetings. She tends to down play her usage. She believes she can manage her sobriety after she discharges.  She is driving and back on her cell phone, she went out with a friend over the weekend. She wants to begin a wellness program and improve her diet. She plans to pursue her MSW. She has not been isolating herself. She is assertive W/O paranoia.    Status after intervention:Improved  Participation level: Active Listener and Interactive  Participation Quality: Appropriate, Attentive, and Sharing  Speech: normal  Thought Process/Content:Logical  Mood/Affect: calm and congruent  Self report: None Reported  Response to learning: Able to verbalize current knowledge/experience, Able to verbalize/acknowledge new learning, Able to retain information, Capable of insight, and Able to change behavior  Discipline Responsible: /Counselor    [x] Check in completed and reviewed.    Intervention required. no       Signature:  BLANQUITA Funez, JULES

## 2024-01-02 NOTE — GROUP NOTE
Group Therapy Note    Date: 1/2/2024    Group Start Time: 11:00 AM  Group End Time: 12:15 PM  Group Topic: Psychoeducation    [facility] Hailey Moon LISW-S Anastasia, Gerald, LISW        Group Therapy Note    Attendees: 6     Patient's Goal:  Pt will be able to participate in discussion regarding importance of self care and make commitment to increase time spent working of self care.    Notes:  Pt did self care assessment. She identified wanting to spend increased time with her friends. and not always taking care of personal hygiene. She reports she is starting back to work next week and has concerns re: what to say to coworkers about why she was off. She feels embarassed and is reluctant to attend JO[facility] support groups as it will get back to her employer. She reports some improvement in mood following participation in groups. She engaged without difficulty, was able to share own experiences, and was open to learning new information.    Status After Intervention:  Improved    Participation Level: Active Listener and Interactive    Participation Quality: Appropriate, Attentive, Sharing, and Supportive      Speech:  normal      Thought Process/Content: Logical  Linear      Affective Functioning: Congruent      Mood: euthymic      Level of consciousness:  Alert, Oriented x4, and Attentive      Response to Learning: Able to verbalize current knowledge/experience, Able to verbalize/acknowledge new learning, Able to retain information, Capable of insight, Able to change behavior, and Progressing to goal      Endings: None Reported    Modes of Intervention: Education, Support, Socialization, and Exploration      Discipline Responsible: /Counselor      Signature:  PERFECTO Sheriff

## 2024-01-03 ENCOUNTER — HOSPITAL ENCOUNTER (OUTPATIENT)
Dept: PSYCHIATRY | Age: 29
Discharge: HOME | End: 2024-01-03

## 2024-01-03 PROCEDURE — 80305 DRUG TEST PRSMV DIR OPT OBS: CPT

## 2024-01-03 PROCEDURE — H0015 ALCOHOL AND/OR DRUG SERVICES: HCPCS

## 2024-01-03 ASSESSMENT — PATIENT HEALTH QUESTIONNAIRE - PHQ9
4. FEELING TIRED OR HAVING LITTLE ENERGY: 1
8. MOVING OR SPEAKING SO SLOWLY THAT OTHER PEOPLE COULD HAVE NOTICED. OR THE OPPOSITE, BEING SO FIGETY OR RESTLESS THAT YOU HAVE BEEN MOVING AROUND A LOT MORE THAN USUAL: 0
3. TROUBLE FALLING OR STAYING ASLEEP: 0
2. FEELING DOWN, DEPRESSED OR HOPELESS: 0
1. LITTLE INTEREST OR PLEASURE IN DOING THINGS: 0
SUM OF ALL RESPONSES TO PHQ QUESTIONS 1-9: 1
9. THOUGHTS THAT YOU WOULD BE BETTER OFF DEAD, OR OF HURTING YOURSELF: 0
SUM OF ALL RESPONSES TO PHQ QUESTIONS 1-9: 1
7. TROUBLE CONCENTRATING ON THINGS, SUCH AS READING THE NEWSPAPER OR WATCHING TELEVISION: 0
10. IF YOU CHECKED OFF ANY PROBLEMS, HOW DIFFICULT HAVE THESE PROBLEMS MADE IT FOR YOU TO DO YOUR WORK, TAKE CARE OF THINGS AT HOME, OR GET ALONG WITH OTHER PEOPLE: 0
SUM OF ALL RESPONSES TO PHQ QUESTIONS 1-9: 1
SUM OF ALL RESPONSES TO PHQ9 QUESTIONS 1 & 2: 0
6. FEELING BAD ABOUT YOURSELF - OR THAT YOU ARE A FAILURE OR HAVE LET YOURSELF OR YOUR FAMILY DOWN: 0
5. POOR APPETITE OR OVEREATING: 0
SUM OF ALL RESPONSES TO PHQ QUESTIONS 1-9: 1

## 2024-01-03 NOTE — PLAN OF CARE
[facility]- Level of Care Placement      []Admissions  []Continued Stay [x]Discharge/Transfer / Complication in [place] Date:1/3/2024    Client Sticker      Level of Care Level 1      Outpatient Services Level 2.1   Intensive Outpatient Services(IOP) Level 2.5   Partial Hospitalization Services Level 3.1 CLINICALLY Managed Low-Intensity Residential Services Level 3.3  CLINICALLY Managed Population- Specific High- Intensity Residential Services Level 3.5  CLINICALLY Managed High Intensive Residential Services Level 3.7  MEDICALLY Monitored Intensive Inpatient Services Level 4  MEDICALLY Managed Intensive Inpatient Services   Dimension 1  Acute Intoxication and/or Withdrawal Potential [x] Not experiencing significant withdrawal    [] Minimal  risk of severe  withdrawal [] Minimal  risk of severe  withdrawal    [] Manageable  at Level 2-WM [] Moderate  risk of severe withdrawal    [] Manageable at Level 2-WM [] No withdrawal risk or minimal or stable withdrawal     [] Concurrently receiving Level -WM or Level 2-WM services [] Minimal risk of severe withdrawal    [] If withdrawal is present, manageable at Level 3.2-WM  [] Minimal risk of severe withdrawal    [] If withdrawal is present manageable at Level 3.2-WM [] High risk of withdrawal, but manageable at Level  3.7-WM and does not require  full resources of a licensed hospital [] At high risk of withdrawal and requires Level 4-WM and full resources of licensed hospital    COMMENTS:           Dimension 2   Biomedical Conditions and Complications  (BMC/C) [x] None or very stable    [] Receiving concurrent medical monitoring  [] None or not a distraction from treatment    [] Problems are manageable at Level 2.1 [] None or not sufficient to distract from treatment    [] Problems are manageable at  Level 2.5 [] None or stable    [] Receiving concurrent medical monitoring  [] None or stable    [] Receiving concurrent medical

## 2024-01-03 NOTE — GROUP NOTE
Group Therapy Note    Date: 1/3/2024    Group Start Time:  8:45 AM  Group End Time: 10:30 AM  Group Topic: Psychotherapy    [facility]    Rohit Robin MSW, JULES    Topic: daily check in and processing   Mode of intervention: self understanding, socialization, group cohesion, catharsis, altruism, and imparting of information.        Group Therapy Note    Attendees: 2       Note: HY will complete the program tomorrow. She plans to return to work this Monday. Her self esteem has improved since she began treatment. She notes a difference in her cognitive functioning since she stopped MJ use. 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 She has more energy and drive to get her MSW. She denies having cravings. She has a strong interest in wirting for TV and plans to go to [place] this fall. She helped to support other group members who are struggling in recovery. In addition, other group members who have lost loved ones, she was able to show support. She noted her coping skills have improved through her treatment.  She was very active in group.     Status after intervention:Improved  Participation level: Active Listener and Interactive  Participation Quality: Appropriate, Attentive, Sharing, and Supportive  Speech: normal  Thought Process/Content:Logical  Mood/Affect: brightens, calm, and congruent  Self report: None Reported  Response to learning: Able to verbalize current knowledge/experience, Able to verbalize/acknowledge new learning, and Able to retain information  Discipline Responsible: /Counselor    [x] Check in completed and reviewed.    Intervention required. no           Signature:  BLANQUITA Funez, JULES

## 2024-01-03 NOTE — PLAN OF CARE
Client discussed in treatment team today.    Present:   Dr. Alva, Rohit BROWNLEE, Hailey BROWNLEE, Lesley Umana LSW 1-2-24.    Current Status: Active in IOP Dual    Treatment goals:    [] Relapse prevention  [] Increase sober support  [] Increase attendance in sober support groups  [] [place] effectively with cravings and urges  [x] Other:     Recommendation: Patient will complete the program this Thursday. She will transition to [facility] for counseling and continue with Dr. Busch. She has not been interested in securing a sponsor or attending sober meetings. All of her drug screens have been negative.    Electronically signed by BLANQUITA Funez, JULES on 1/3/2024 at 8:44 AM

## 2024-01-03 NOTE — GROUP NOTE
Group Therapy Note    Date: 1/3/2024    Group Start Time: 10:45 AM  Group End Time: 12:00 PM  Group Topic: Psychotherapy    [facility]    Rohit Robin MSW, KEVINW    Topic: Self care tips and coping skills.  Mode of intervention: self understanding, interpersonal learning, socialization, altruism, instillation of hope, and imparting of information.     Note: Today's group focused on improving self care through healthy meals, wellness, adequate sleep, personal hygiene, and living a sober life. HY wants to focus on maintaining her sobriety, caring for her personal hygiene, giving time for herself, using make up when dressing for outside contact.     Status after intervention:Improved  Participation level: Active Listener and Interactive  Participation Quality: Appropriate, Attentive, and Sharing  Speech: normal  Thought Process/Content:Logical  Mood/Affect: calm, congruent, and euphoria  Self report: None Reported  Response to learning: Able to verbalize current knowledge/experience, Able to verbalize/acknowledge new learning, Able to retain information, and Capable of insight  Discipline Responsible: /Counselor    [x] Check in completed and reviewed.    Intervention required. no       Signature:  BLANQUITA Funez, JULES

## 2024-01-04 ENCOUNTER — HOSPITAL ENCOUNTER (OUTPATIENT)
Dept: PSYCHIATRY | Age: 29
Setting detail: THERAPIES SERIES
Discharge: HOME | End: 2024-01-04
Payer: COMMERCIAL

## 2024-01-04 PROCEDURE — H0015 ALCOHOL AND/OR DRUG SERVICES: HCPCS

## 2024-01-04 NOTE — GROUP NOTE
Group Therapy Note    Date: 1/4/2024    Group Start Time:  8:45 AM  Group End Time: 10:30 AM  Group Topic: Psychotherapy    [facility]    Rohit Robin, MSW, LSW    Topic: Daily Check in and self disclosure.  Mode of intervention: self understanding, group cohesion, socialization, imparting of information, altruism, and interpersonal learning.       Note: This is Noe's last session. She demonstrated a joyful disposition and was active with self disclosure and support towards other peers. She noted her self-esteem has improved along with the disappearance of paranoid thoughts. She would like to establish separation from her mother and to make independent decisions  on her own. She is considering returning to [facility] this year for a MSW. She will return to work this Monday. She is to follow with Dr. Oropeza on the 9 th of this month. She may seek counseling through [facility] at a later time.    Status after intervention:Improved  Participation level: Active Listener and Interactive  Participation Quality: Appropriate, Attentive, Sharing, and Supportive  Speech: normal  Thought Process/Content:Logical  Mood/Affect: euphoria  Self report: None Reported  Response to learning: Able to verbalize current knowledge/experience, Able to verbalize/acknowledge new learning, Able to retain information, and Capable of insight  Discipline Responsible: /Counselor    [x] Check in completed and reviewed.    Intervention required. no

## 2024-01-04 NOTE — GROUP NOTE
Group Therapy Note    Date: 1/4/2024    Group Start Time: 10:45 AM  Group End Time: 12:00 PM  Group Topic: Psychoeducation    [facility]    Hailey Medina LISW-S Goodwin, Sharon, LSW        Group Therapy Note    Attendees: 8     Patient's Goal: Pt will participate in group activity and discussion regarding importance of engaging in healthy hobbies that are enjoyable. Pt able to acknowledge and reflect on how juggling too many responsibilities at one time or reflecting on things in the past or future prevents us from successful functioning.      Notes: Pt was an active participant in group activity and discussion. She was able to identify activities she enjoys (learning how to cook, writing, spending time with friends). She asks good questions re: recovery support group meetings and shares interest in attending them but states mom has been pressuring pt to not attend as \"our family is too well known\". Pt also is resistant to attending irvin support group meetings as they are held at [place] and pt is a sw and is afraid that mh agencies may find out and won't want to hire her. This is pts last day in iop and pt able to vocalize feeling ready for d/c but will miss iop groups and peers. Other pts provided her with support and encouragement. She reports some improvement in mood following participation in groups. She engaged without difficulty, was able to share own experiences, and was open to learning new information      Status After Intervention:  Improved    Participation Level: Active Listener and Interactive    Participation Quality: Appropriate, Attentive, Sharing, and Supportive      Speech:  normal      Thought Process/Content: Logical  Linear      Affective Functioning: Congruent      Mood: euthymic      Level of consciousness:  Alert, Oriented x4, and Attentive      Response to Learning: Able to verbalize current knowledge/experience,

## 2024-02-28 ENCOUNTER — OFFICE VISIT (OUTPATIENT)
Dept: FAMILY MEDICINE CLINIC | Age: 29
End: 2024-02-28
Payer: COMMERCIAL

## 2024-02-28 VITALS
WEIGHT: 168 LBS | SYSTOLIC BLOOD PRESSURE: 116 MMHG | HEIGHT: 67 IN | RESPIRATION RATE: 18 BRPM | BODY MASS INDEX: 26.37 KG/M2 | OXYGEN SATURATION: 98 % | DIASTOLIC BLOOD PRESSURE: 77 MMHG | TEMPERATURE: 97.8 F

## 2024-02-28 DIAGNOSIS — J01.10 ACUTE NON-RECURRENT FRONTAL SINUSITIS: Primary | ICD-10-CM

## 2024-02-28 DIAGNOSIS — R68.89 FLU-LIKE SYMPTOMS: ICD-10-CM

## 2024-02-28 PROCEDURE — G8419 CALC BMI OUT NRM PARAM NOF/U: HCPCS

## 2024-02-28 PROCEDURE — 1036F TOBACCO NON-USER: CPT

## 2024-02-28 PROCEDURE — G8427 DOCREV CUR MEDS BY ELIG CLIN: HCPCS

## 2024-02-28 PROCEDURE — 87428 SARSCOV & INF VIR A&B AG IA: CPT

## 2024-02-28 PROCEDURE — 87880 STREP A ASSAY W/OPTIC: CPT

## 2024-02-28 PROCEDURE — G8484 FLU IMMUNIZE NO ADMIN: HCPCS

## 2024-02-28 PROCEDURE — 99213 OFFICE O/P EST LOW 20 MIN: CPT

## 2024-02-28 RX ORDER — AMOXICILLIN 875 MG/1
875 TABLET, COATED ORAL 2 TIMES DAILY
Qty: 20 TABLET | Refills: 0 | Status: SHIPPED | OUTPATIENT
Start: 2024-02-28 | End: 2024-03-09

## 2024-02-28 RX ORDER — BROMPHENIRAMINE MALEATE, PSEUDOEPHEDRINE HYDROCHLORIDE, AND DEXTROMETHORPHAN HYDROBROMIDE 2; 30; 10 MG/5ML; MG/5ML; MG/5ML
SYRUP ORAL
Qty: 180 ML | Refills: 0 | Status: SHIPPED | OUTPATIENT
Start: 2024-02-28

## 2024-02-28 NOTE — PROGRESS NOTES
24  Noe Yusuf : 1995 Sex: female  Age 28 y.o.    Subjective:  Chief Complaint   Patient presents with    Cough     Chest congestion, sinus drainage, sore throat, ear pressure started Monday       HPI:   Noe Yusuf , 28 y.o. female presents to the clinic for evaluation of cough x 3 days. The patient also reports chest congestion, sinus drainage, sore throat and ear pressure. The patient has taken Dayquil/Nyquil and advil flu and cold for symptoms. The patient reports worsening symptoms over time. The patient deneies ill exposure. The patient denies hx of COVID-19. The patient denies acute loss of taste and smell, headache, sinus congestion, rash, and fever. The patient also denies chest pain, abdominal pain, shortness of breath, wheezing, and nausea / vomiting / diarrhea.    ROS:   Unless otherwise stated in this report the patient's positive and negative responses for review of systems for constitutional, eyes, ENT, cardiovascular, respiratory, gastrointestinal, neurological, , musculoskeletal, and integument systems and related systems to the presenting problem are either stated in the history of present illness or were not pertinent or were negative for the symptoms and/or complaints related to the presenting medical problem.  Positives and pertinent negatives as per HPI.  All others reviewed and are negative.      PMH:     Past Medical History:   Diagnosis Date    Acne     Bipolar disorder (HCC)        Past Surgical History:   Procedure Laterality Date    OTHER SURGICAL HISTORY Left 2014    [place] bunionectomy       Family History   Problem Relation Age of Onset    Cancer Father     High Blood Pressure Father     Heart Disease Father        Medications:     Current Outpatient Medications:     brompheniramine-pseudoephedrine-DM 2-30-10 MG/5ML syrup, 5 - 10 mL by mouth every 6 hours as needed for cough / congestion., Disp: 180 mL, Rfl: 0    amoxicillin (AMOXIL) 875 MG tablet, Take

## 2024-03-19 LAB
ALBUMIN SERPL-MCNC: 4.4 G/DL (ref 3.5–5.2)
ALP SERPL-CCNC: 39 U/L (ref 35–104)
ALT SERPL-CCNC: 6 U/L (ref 0–32)
ANION GAP SERPL CALCULATED.3IONS-SCNC: 10 MMOL/L (ref 7–16)
AST SERPL-CCNC: 16 U/L (ref 0–31)
BILIRUB SERPL-MCNC: 0.7 MG/DL (ref 0–1.2)
BUN SERPL-MCNC: 6 MG/DL (ref 6–20)
CALCIUM SERPL-MCNC: 9.5 MG/DL (ref 8.6–10.2)
CHLORIDE SERPL-SCNC: 103 MMOL/L (ref 98–107)
CO2 SERPL-SCNC: 24 MMOL/L (ref 22–29)
CREAT SERPL-MCNC: 0.7 MG/DL (ref 0.5–1)
GFR SERPL CREATININE-BSD FRML MDRD: >60 ML/MIN/1.73M2
GLUCOSE P FAST SERPL-MCNC: 85 MG/DL (ref 74–99)
POTASSIUM SERPL-SCNC: 3.9 MMOL/L (ref 3.5–5)
PROT SERPL-MCNC: 6.9 G/DL (ref 6.4–8.3)
SODIUM SERPL-SCNC: 137 MMOL/L (ref 132–146)

## 2024-03-20 LAB
LITHIUM DATE LAST DOSE: NORMAL
LITHIUM DOSE AMOUNT: NORMAL
LITHIUM DOSE TIME: NORMAL
LITHIUM LEVEL: 0.6 MMOL/L (ref 0.5–1.5)

## 2024-07-06 ENCOUNTER — HOSPITAL ENCOUNTER (EMERGENCY)
Age: 29
Discharge: HOME | End: 2024-07-06
Attending: EMERGENCY MEDICINE

## 2024-07-06 VITALS
HEART RATE: 54 BPM | RESPIRATION RATE: 18 BRPM | SYSTOLIC BLOOD PRESSURE: 118 MMHG | TEMPERATURE: 97.2 F | HEIGHT: 67 IN | OXYGEN SATURATION: 100 % | BODY MASS INDEX: 28.25 KG/M2 | WEIGHT: 180 LBS | DIASTOLIC BLOOD PRESSURE: 82 MMHG

## 2024-07-06 DIAGNOSIS — M54.50 LOW BACK PAIN WITHOUT SCIATICA, UNSPECIFIED BACK PAIN LATERALITY, UNSPECIFIED CHRONICITY: Primary | ICD-10-CM

## 2024-07-06 DIAGNOSIS — Z20.2 POSSIBLE EXPOSURE TO STD: ICD-10-CM

## 2024-07-06 LAB
BACTERIA URNS QL MICRO: ABNORMAL
BILIRUB UR QL STRIP: NEGATIVE
CLARITY UR: CLEAR
COLOR UR: YELLOW
EPI CELLS #/AREA URNS HPF: ABNORMAL /HPF
GLUCOSE UR STRIP-MCNC: NEGATIVE MG/DL
HCG, URINE, POC: NEGATIVE
HGB UR QL STRIP.AUTO: ABNORMAL
KETONES UR STRIP-MCNC: NEGATIVE MG/DL
LEUKOCYTE ESTERASE UR QL STRIP: NEGATIVE
Lab: NORMAL
NEGATIVE QC PASS/FAIL: NORMAL
NITRITE UR QL STRIP: NEGATIVE
PH UR STRIP: 6.5 [PH] (ref 5–9)
POSITIVE QC PASS/FAIL: NORMAL
PROT UR STRIP-MCNC: NEGATIVE MG/DL
RBC #/AREA URNS HPF: ABNORMAL /HPF
SP GR UR STRIP: 1.02 (ref 1–1.03)
UROBILINOGEN UR STRIP-ACNC: 0.2 EU/DL (ref 0–1)
WBC #/AREA URNS HPF: ABNORMAL /HPF

## 2024-07-06 PROCEDURE — 99284 EMERGENCY DEPT VISIT MOD MDM: CPT

## 2024-07-06 PROCEDURE — 96372 THER/PROPH/DIAG INJ SC/IM: CPT

## 2024-07-06 PROCEDURE — 87491 CHLMYD TRACH DNA AMP PROBE: CPT

## 2024-07-06 PROCEDURE — 6360000002 HC RX W HCPCS

## 2024-07-06 PROCEDURE — 81001 URINALYSIS AUTO W/SCOPE: CPT

## 2024-07-06 PROCEDURE — 87591 N.GONORRHOEAE DNA AMP PROB: CPT

## 2024-07-06 RX ORDER — AZITHROMYCIN 250 MG/1
1000 TABLET, FILM COATED ORAL ONCE
Status: DISCONTINUED | OUTPATIENT
Start: 2024-07-06 | End: 2024-07-06 | Stop reason: HOSPADM

## 2024-07-06 RX ORDER — IBUPROFEN 400 MG/1
400 TABLET ORAL EVERY 6 HOURS PRN
Qty: 120 TABLET | Refills: 0 | Status: SHIPPED | OUTPATIENT
Start: 2024-07-06

## 2024-07-06 RX ORDER — KETOROLAC TROMETHAMINE 30 MG/ML
30 INJECTION, SOLUTION INTRAMUSCULAR; INTRAVENOUS ONCE
Status: COMPLETED | OUTPATIENT
Start: 2024-07-06 | End: 2024-07-06

## 2024-07-06 RX ORDER — CEFTRIAXONE 500 MG/1
500 INJECTION, POWDER, FOR SOLUTION INTRAMUSCULAR; INTRAVENOUS ONCE
Status: DISCONTINUED | OUTPATIENT
Start: 2024-07-06 | End: 2024-07-06 | Stop reason: HOSPADM

## 2024-07-06 RX ORDER — ORPHENADRINE CITRATE 30 MG/ML
60 INJECTION INTRAMUSCULAR; INTRAVENOUS ONCE
Status: COMPLETED | OUTPATIENT
Start: 2024-07-06 | End: 2024-07-06

## 2024-07-06 RX ORDER — CYCLOBENZAPRINE HCL 5 MG
5 TABLET ORAL 2 TIMES DAILY PRN
Qty: 10 TABLET | Refills: 0 | Status: SHIPPED | OUTPATIENT
Start: 2024-07-06 | End: 2024-07-16

## 2024-07-06 RX ADMIN — KETOROLAC TROMETHAMINE 30 MG: 30 INJECTION INTRAMUSCULAR; INTRAVENOUS at 07:30

## 2024-07-06 RX ADMIN — ORPHENADRINE CITRATE 60 MG: 60 INJECTION INTRAMUSCULAR; INTRAVENOUS at 07:29

## 2024-07-06 ASSESSMENT — PAIN DESCRIPTION - ORIENTATION: ORIENTATION: RIGHT

## 2024-07-06 ASSESSMENT — PAIN SCALES - GENERAL
PAINLEVEL_OUTOF10: 8
PAINLEVEL_OUTOF10: 9

## 2024-07-06 ASSESSMENT — PAIN DESCRIPTION - LOCATION
LOCATION: BACK
LOCATION: BACK

## 2024-07-06 ASSESSMENT — LIFESTYLE VARIABLES
HOW OFTEN DO YOU HAVE A DRINK CONTAINING ALCOHOL: NEVER
HOW MANY STANDARD DRINKS CONTAINING ALCOHOL DO YOU HAVE ON A TYPICAL DAY: PATIENT DOES NOT DRINK

## 2024-07-06 ASSESSMENT — PAIN DESCRIPTION - DESCRIPTORS: DESCRIPTORS: CRAMPING;DISCOMFORT

## 2024-07-06 NOTE — ED PROVIDER NOTES
[facility] EMERGENCY DEPARTMENT  EMERGENCY DEPARTMENT ENCOUNTER        Pt Name: Noe Yusuf  MRN: [id number]  Birthdate 1995  Date of evaluation: 7/6/2024  Provider: Ruthie Rao DO  PCP: German Blount MD  Note Started: 9:59 AM EDT 7/6/24    CHIEF COMPLAINT       Chief Complaint   Patient presents with    Back Pain     Slept on a couch x3 days and back was stiff and started having pain/spasms/ no loss of bowel or bladder       HISTORY OF PRESENT ILLNESS: 1 or more Elements   History From: Patient    Limitations to history : None    Noe Yusuf is a 28 y.o. female with past medical history of bipolar disorder who presents for the chief complaint of back pain.  She states since Thursday she has been staying with a friend and sleeping on the couch and began to notice some stiffness in her back because of this.  This morning the patient got muscle spasms in the lumbar region of her back that brought her to her knees.  She states the pain is sharp and stabbing in nature, but is not associated with any numbness or tingling in her upper or lower extremities.  She states the episodes of sharp stabbing pain last for about 1 minute with movement making the pain worse.  Nothing has made the pain better.    Patient denies any IV drug use, denies bowel or bladder incontinence, denies saddle paresthesia, denies previous back surgeries.      Patient denies fever, chills, headache, shortness of breath, chest pain, abdominal pain, nausea, vomiting, diarrhea, lightheadedness, dysuria, hematuria, hematochezia, and melena.    Patient requested testing for STIs.    Nursing Notes were all reviewed and agreed with or any disagreements were addressed in the HPI.        REVIEW OF EXTERNAL NOTES :         Reviewed OB/GYN note from 3/28/2024      REVIEW OF SYSTEMS :           Positives and Pertinent negatives as per HPI.     SURGICAL HISTORY     Past Surgical History:   Procedure

## 2024-07-06 NOTE — DISCHARGE INSTR - COC
Continuity of Care Form    Patient Name: Noe Yusuf   :  1995  MRN:  [id number]    Admit date:  2024  Discharge date:  ***    Code Status Order: No Order   Advance Directives:     Admitting Physician:  No admitting provider for patient encounter.  PCP: German Blount MD    Discharging Nurse: ***  Discharging Hospital Unit/Room#:   Discharging Unit Phone Number: ***    Emergency Contact:   Extended Emergency Contact Information  Primary Emergency Contact: Marge Yusuf  Address: [address] [place] [address]            [address]  Home Phone: [phone]  Mobile Phone: [phone]  Relation: Parent    Past Surgical History:  Past Surgical History:   Procedure Laterality Date    OTHER SURGICAL HISTORY Left 2014    [place] bunionectomy       Immunization History:   Immunization History   Administered Date(s) Administered    COVID-19, MODERNA Bivalent, (age 12y+), IM, 50 mcg/0.5 mL 10/17/2022    Meningococcal ACWY, MENACTRA (MenACWY-D), (age 9m-55y), IM, 0.5mL 2008    TDaP, ADACEL (age 10y-64y), BOOSTRIX (age 10y+), IM, 0.5mL 2020       Active Problems:  Patient Active Problem List   Diagnosis Code    Hallux valgus, acquired M20.10    Bipolar disorder, current episode mixed, moderate (HCC) F31.62    Breast discharge N64.52    Hypertriglyceridemia E78.1       Isolation/Infection:   Isolation            No Isolation          Patient Infection Status       None to display                     Nurse Assessment:  Last Vital Signs: /82   Pulse 54   Temp 97.2 °F (36.2 °C) (Oral)   Resp 18   Ht 1.702 m (5' 7\")   Wt 81.6 kg (180 lb)   SpO2 100%   BMI 28.19 kg/m²     Last documented pain score (0-10 scale): Pain Level: 9  Last Weight:   Wt Readings from Last 1 Encounters:   24 81.6 kg (180 lb)     Mental Status:  {IP PT MENTAL STATUS:}    IV Access:  { [facility] IV ACCESS:[id number]}    Nursing Mobility/ADLs:  Walking   {[facility] DME ADLs:[id number]}  Transfer  {[facility]

## 2024-07-09 LAB
CHLAMYDIA DNA UR QL NAA+PROBE: NEGATIVE
N GONORRHOEA DNA UR QL NAA+PROBE: NEGATIVE
SPECIMEN DESCRIPTION: NORMAL

## 2024-08-21 ENCOUNTER — OFFICE VISIT (OUTPATIENT)
Dept: FAMILY MEDICINE CLINIC | Age: 29
End: 2024-08-21
Payer: COMMERCIAL

## 2024-08-21 VITALS
HEART RATE: 91 BPM | OXYGEN SATURATION: 99 % | SYSTOLIC BLOOD PRESSURE: 133 MMHG | DIASTOLIC BLOOD PRESSURE: 89 MMHG | RESPIRATION RATE: 18 BRPM | BODY MASS INDEX: 28.25 KG/M2 | HEIGHT: 67 IN | WEIGHT: 180 LBS | TEMPERATURE: 97.3 F

## 2024-08-21 DIAGNOSIS — R68.89 FLU-LIKE SYMPTOMS: Primary | ICD-10-CM

## 2024-08-21 DIAGNOSIS — U07.1 COVID: ICD-10-CM

## 2024-08-21 LAB
INFLUENZA A ANTIGEN, POC: NEGATIVE
INFLUENZA B ANTIGEN, POC: NEGATIVE
LOT EXPIRE DATE: ABNORMAL
LOT KIT NUMBER: ABNORMAL
SARS-COV-2, POC: DETECTED
VALID INTERNAL CONTROL: ABNORMAL
VENDOR AND KIT NAME POC: ABNORMAL

## 2024-08-21 PROCEDURE — 87428 SARSCOV & INF VIR A&B AG IA: CPT

## 2024-08-21 PROCEDURE — 99213 OFFICE O/P EST LOW 20 MIN: CPT

## 2024-08-21 RX ORDER — METHYLPREDNISOLONE 4 MG/1
TABLET ORAL
Qty: 1 KIT | Refills: 0 | Status: SHIPPED | OUTPATIENT
Start: 2024-08-21

## 2024-08-21 RX ORDER — GUAIFENESIN 600 MG/1
600 TABLET, EXTENDED RELEASE ORAL 2 TIMES DAILY
Qty: 28 TABLET | Refills: 0 | Status: SHIPPED | OUTPATIENT
Start: 2024-08-21

## 2024-08-21 RX ORDER — ONDANSETRON 4 MG/1
4 TABLET, ORALLY DISINTEGRATING ORAL EVERY 8 HOURS PRN
Qty: 15 TABLET | Refills: 0 | Status: SHIPPED | OUTPATIENT
Start: 2024-08-21

## 2024-08-21 NOTE — PROGRESS NOTES
Chief Complaint       Nausea & Vomiting (Cough, sinus congestion and drainage, started a few days ago)      History of Present Illness   Source of history provided by:  patient.    Noe Yusuf is a 28 y.o. old female presenting to the walk in clinic for evaluation of nausea/vomiting, cough sinus congestion/drainage. Denies any SOB, CP, dyspnea, LE edema, abdominal pain, vomiting, rash, or lethargy. Denies any hx of asthma or COPD. Patient denies recent sick exposures. Patient has been vaccinated for COVID-19. Patient has been taking nothing OTC without symptomatic relief.      ROS    Unless otherwise stated in this report or unable to obtain because of the patient's clinical or mental status as evidenced by the medical record, this patients's positive and negative responses for Review of Systems, constitutional, psych, eyes, ENT, cardiovascular, respiratory, gastrointestinal, neurological, genitourinary, musculoskeletal, integument systems and systems related to the presenting problem are either stated in the preceding or were not pertinent or were negative for the symptoms and/or complaints related to the medical problem.      Physical Exam         VS:  /89   Pulse 91   Temp 97.3 °F (36.3 °C) (Temporal)   Resp 18   Ht 1.702 m (5' 7.01\")   Wt 81.6 kg (180 lb)   SpO2 99%   BMI 28.19 kg/m²    Oxygen Saturation Interpretation: Normal.    Constitutional:  Alert, development consistent with age. NAD.  Head:  NC/NT. Airway patent.   Mouth: Posterior pharynx with mild erythema and clear postnasal drip. No tonsillar hypertrophy or exudate.   Neck:  Normal ROM. Supple. No anterior cervical adenopathy noted.  Lungs: CTAB without wheezes, rales, or rhonchi.   CV:  Regular rate and rhythm, normal heart sounds, without pathological murmurs, ectopy, gallops, or rubs.  Skin:  Normal turgor.  Warm, dry, without visible rash.  Lymphatic: No lymphangitis or adenopathy noted.  Neurological:  Oriented.  Motor

## 2024-08-27 ENCOUNTER — OFFICE VISIT (OUTPATIENT)
Dept: FAMILY MEDICINE CLINIC | Age: 29
End: 2024-08-27
Payer: COMMERCIAL

## 2024-08-27 VITALS — HEART RATE: 87 BPM | SYSTOLIC BLOOD PRESSURE: 120 MMHG | OXYGEN SATURATION: 98 % | DIASTOLIC BLOOD PRESSURE: 78 MMHG

## 2024-08-27 DIAGNOSIS — M54.40 BILATERAL LOW BACK PAIN WITH SCIATICA, SCIATICA LATERALITY UNSPECIFIED, UNSPECIFIED CHRONICITY: ICD-10-CM

## 2024-08-27 DIAGNOSIS — E66.3 OVERWEIGHT (BMI 25.0-29.9): ICD-10-CM

## 2024-08-27 DIAGNOSIS — E55.9 HYPOVITAMINOSIS D: ICD-10-CM

## 2024-08-27 DIAGNOSIS — F31.62 BIPOLAR DISORDER, CURRENT EPISODE MIXED, MODERATE (HCC): ICD-10-CM

## 2024-08-27 DIAGNOSIS — U07.1 COVID-19: Primary | ICD-10-CM

## 2024-08-27 LAB
Lab: NORMAL
PERFORMING INSTRUMENT: NORMAL
QC PASS/FAIL: NORMAL
SARS-COV-2, POC: NORMAL

## 2024-08-27 PROCEDURE — 87426 SARSCOV CORONAVIRUS AG IA: CPT | Performed by: FAMILY MEDICINE

## 2024-08-27 PROCEDURE — 99214 OFFICE O/P EST MOD 30 MIN: CPT | Performed by: FAMILY MEDICINE

## 2024-08-27 SDOH — ECONOMIC STABILITY: FOOD INSECURITY: WITHIN THE PAST 12 MONTHS, THE FOOD YOU BOUGHT JUST DIDN'T LAST AND YOU DIDN'T HAVE MONEY TO GET MORE.: NEVER TRUE

## 2024-08-27 SDOH — ECONOMIC STABILITY: INCOME INSECURITY: HOW HARD IS IT FOR YOU TO PAY FOR THE VERY BASICS LIKE FOOD, HOUSING, MEDICAL CARE, AND HEATING?: NOT HARD AT ALL

## 2024-08-27 SDOH — ECONOMIC STABILITY: FOOD INSECURITY: WITHIN THE PAST 12 MONTHS, YOU WORRIED THAT YOUR FOOD WOULD RUN OUT BEFORE YOU GOT MONEY TO BUY MORE.: NEVER TRUE

## 2024-08-27 NOTE — PATIENT INSTRUCTIONS
REGULAR DIET  TAKE MEDICATIONS AS PER PSYCHIATRIST  FOR MOOD CONTROL.  REGULAR EXERCISE  ADVISED.  DISCUSSED AND ADVISED COMPLIANCE.  TAKE RYBELSUS 3 MG. DAILY FOR WEIGHT LOSS.  TAKE VITAMIN D-3 68075 UNITS WEEKLY FOR VITAMIN DEFICIENCY.  FASTING FOR LAB WORK ONE MORNING.  NEXT APPOINTMENT IN 2 MONTHS.

## 2024-08-27 NOTE — PROGRESS NOTES
OFFICE PROGRESS NOTE      SUBJECTIVE:        Patient ID:   Noe Yusuf is a 28 y.o. female who presents for   Chief Complaint   Patient presents with    Other     Positive for covid 6 days ago           HPI:     RECHECK HYPOVITAMINOSIS D AND OVER WEIGHT.  TESTED POSITIVE FOR COVID-19 1 WEEK AGO. WANTS TO GET CHECKED AGAIN TODAY.  ALSO HAS BACK PROBLEM OFF AND ON. WILL TRY HEATING PAD, TYLENOL AND LOCAL ANALGESIC CREAM AS RECOMMENDED FOR RELIEF.  WANTS TO TRY RYBELSUS AGAIN FOR WEIGHT CONTROL.  WATCHING DIET BUT NOT GOOD.  PHYSICALLY ACTIVE FOR EXERCISE.  TAKING MEDICATIONS REGULARLY.         Prior to Admission medications    Medication Sig Start Date End Date Taking? Authorizing Provider   Semaglutide 3 MG TABS Take 3 mg by mouth daily 8/27/24  Yes German Blount MD   vitamin D (ERGOCALCIFEROL) 1.25 MG (88224 UT) CAPS capsule Take 1 capsule by mouth once a week 8/8/23   German Blount MD   paliperidone palmitate ER (INVEGA SUSTENNA) 156 MG/ML CHRIS IM injection Inject 156 mg into the muscle every 30 days    Provider, MD Shane     Social History     Socioeconomic History    Marital status: Single   Tobacco Use    Smoking status: Former     Current packs/day: 0.00     Average packs/day: 0.5 packs/day for 0.5 years (0.3 ttl pk-yrs)     Types: Cigarettes     Start date: 07/2020     Quit date: 2021     Years since quitting: 3.6    Smokeless tobacco: Never   Vaping Use    Vaping status: Never Used   Substance and Sexual Activity    Alcohol use: No    Drug use: No     Social Determinants of Health     Financial Resource Strain: Low Risk  (8/27/2024)    Overall Financial Resource Strain (CARDIA)     Difficulty of Paying Living Expenses: Not hard at all   Food Insecurity: No Food Insecurity (8/27/2024)    Hunger Vital Sign     Worried About Running Out of Food in the Last Year: Never true     Ran Out of Food in the Last Year: Never true   Transportation Needs: Unknown  (8/27/2024)    PRAPARE - Transportation     Lack of Transportation (Non-Medical): No   Housing Stability: Unknown (8/27/2024)    Housing Stability Vital Sign     Homeless in the Last Year: No       I have reviewed Noe's allergies, medications, problem list, medical, social and family history and have updated as needed in the electronic medical record”  Review Of Systems:    Skin: no abnormal pigmentation, rash, scaling, itching, masses, hair or nail changes  Eyes: no blurring, diplopia, or eye pain  Ears/Nose/Throat: no hearing loss, tinnitus, vertigo, nosebleed, nasal congestion, rhinorrhea, sore throat  Respiratory: no cough, pleuritic chest pain, dyspnea, or wheezing  Cardiovascular: no chest pain, angina, dyspnea on exertion, orthopnea, PND, palpitations, or claudication  Gastrointestinal: no nausea, vomiting, heartburn, diarrhea, constipation, bloating,  abdominal pain, or blood per rectum.Appetite is good  Genitourinary: no urinary urgency, frequency, dysuria, nocturia, hesitancy, or incontinence  Musculoskeletal:Ambulating well  Neurologic: no paralysis, paresis, paresthesia, seizures, tremors, or headaches  Hematologic/Lymphatic/Immunologic: no anemia, abnormal bleeding/bruising, fever, chills, night sweats, swollen glands, or unexplained weight loss  Endocrine: no heat or cold intolerance and no polyphagia, polydipsia, or polyuria        OBJECTIVE:     VS:  Wt Readings from Last 3 Encounters:   08/21/24 81.6 kg (180 lb)   07/06/24 81.6 kg (180 lb)   02/28/24 76.2 kg (168 lb)     Temp Readings from Last 3 Encounters:   08/21/24 97.3 °F (36.3 °C) (Temporal)   07/06/24 97.2 °F (36.2 °C) (Oral)   02/28/24 97.8 °F (36.6 °C) (Temporal)     BP Readings from Last 3 Encounters:   08/27/24 120/78   08/21/24 133/89   07/06/24 118/82        General appearance: Alert, Awake, Oriented times 3, no distress  Skin: Warm and dry  Head: Normocephalic. No masses, lesions or tenderness noted  Eyes: Conjunctivae appear

## 2024-08-28 LAB — VITAMIN D 25-HYDROXY: 27 NG/ML (ref 30–100)

## 2024-08-28 NOTE — RESULT ENCOUNTER NOTE
VITAMIN D LEVEL STILL TOO LOW. TAKE VITAMIN D-3 52225 UNITS WEEKLY REGULARLY.  PLEASE ACKNOWLEDGE RECEIPT OF INFORMATION BY REPLYING THE MESSAGE. THANKS.

## 2024-10-04 ENCOUNTER — OFFICE VISIT (OUTPATIENT)
Dept: FAMILY MEDICINE CLINIC | Age: 29
End: 2024-10-04
Payer: COMMERCIAL

## 2024-10-04 VITALS
WEIGHT: 194 LBS | BODY MASS INDEX: 30.38 KG/M2 | OXYGEN SATURATION: 98 % | DIASTOLIC BLOOD PRESSURE: 70 MMHG | SYSTOLIC BLOOD PRESSURE: 118 MMHG | HEART RATE: 87 BPM

## 2024-10-04 DIAGNOSIS — M54.41 ACUTE RIGHT-SIDED LOW BACK PAIN WITH RIGHT-SIDED SCIATICA: ICD-10-CM

## 2024-10-04 DIAGNOSIS — F31.62 BIPOLAR DISORDER, CURRENT EPISODE MIXED, MODERATE (HCC): ICD-10-CM

## 2024-10-04 DIAGNOSIS — E55.9 HYPOVITAMINOSIS D: ICD-10-CM

## 2024-10-04 DIAGNOSIS — R06.83 LOUD SNORING: ICD-10-CM

## 2024-10-04 DIAGNOSIS — E66.811 OBESITY (BMI 30.0-34.9): Primary | ICD-10-CM

## 2024-10-04 PROCEDURE — 99214 OFFICE O/P EST MOD 30 MIN: CPT | Performed by: FAMILY MEDICINE

## 2024-10-04 RX ORDER — ERGOCALCIFEROL 1.25 MG/1
50000 CAPSULE, LIQUID FILLED ORAL WEEKLY
Qty: 12 CAPSULE | Refills: 1 | Status: SHIPPED | OUTPATIENT
Start: 2024-10-04

## 2024-10-04 RX ORDER — METHYLPREDNISOLONE 4 MG
TABLET, DOSE PACK ORAL
Qty: 1 KIT | Refills: 0 | Status: SHIPPED
Start: 2024-10-04 | End: 2024-10-04

## 2024-10-04 RX ORDER — ERGOCALCIFEROL 1.25 MG/1
50000 CAPSULE, LIQUID FILLED ORAL WEEKLY
Qty: 12 CAPSULE | Refills: 1 | Status: SHIPPED
Start: 2024-10-04 | End: 2024-10-04

## 2024-10-04 RX ORDER — METHYLPREDNISOLONE 4 MG
TABLET, DOSE PACK ORAL
Qty: 1 KIT | Refills: 0 | Status: SHIPPED | OUTPATIENT
Start: 2024-10-04 | End: 2024-10-10

## 2024-10-04 NOTE — PATIENT INSTRUCTIONS
REGULAR DIET  TAKE MEDICATIONS AS PER PSYCHIATRIST  FOR MOOD CONTROL.  REGULAR EXERCISE  ADVISED.  DISCUSSED AND ADVISED COMPLIANCE.  TAKE RYBELSUS 3 MG. DAILY FOR WEIGHT LOSS.  TAKE VITAMIN D-3 64127 UNITS WEEKLY FOR VITAMIN DEFICIENCY.  GO TO SLEEP STUDY CLINIC AS SCHEDULED.  FASTING FOR LAB WORK PRIOR TO NEXT VISIT.  NEXT APPOINTMENT IN 6 WEEKS.

## 2024-10-04 NOTE — PROGRESS NOTES
OFFICE PROGRESS NOTE      SUBJECTIVE:        Patient ID:   Noe Yusuf is a 29 y.o. female who presents for   Chief Complaint   Patient presents with    Back Pain     Down right leg           HPI:     RECHECK OBESITY AND HYPOVITAMINOSIS D.  DID NOT GET SAMGLUTIDE AS ORDERED BEFORE. WOULD LIKE TO TRY IT AS SHE HAS BEEN STEADILY GAINING WEIGHT.  HAS PAIN IN THE BACK FOR 2 DAYS. STARTED AFTER LIFTING HEAVY OBJECT BACKWARDS WITH RIGHT HAND. REQUEST STEROID INJECTION.  HAS BEEN SNORING A LOT AS PER FAMILY MEMBERS. PROBLEM GETTING WORSE LATELY. AGREES TO GO TO SLEEP STUDY CLINIC FOR EVALUATION.  MEDICATION REFILL.  FEELS GOOD.   WATCHING DIET GOOD.  WALKING FOR EXERCISE.  TAKING MEDICATIONS REGULARLY.         Prior to Admission medications    Medication Sig Start Date End Date Taking? Authorizing Provider   Semaglutide 3 MG TABS Take 3 mg by mouth daily 10/4/24  Yes German Blount MD   vitamin D (ERGOCALCIFEROL) 1.25 MG (76859 UT) CAPS capsule Take 1 capsule by mouth once a week 10/4/24  Yes German lBount MD   methylPREDNISolone (MEDROL DOSEPACK) 4 MG tablet Take by mouth. 10/4/24 10/10/24 Yes German Blount MD   paliperidone palmitate ER (INVEGA SUSTENNA) 156 MG/ML CHRIS IM injection Inject 156 mg into the muscle every 30 days   Yes Provider, MD Shane     Social History     Socioeconomic History    Marital status: Single     Spouse name: None    Number of children: None    Years of education: None    Highest education level: None   Tobacco Use    Smoking status: Former     Current packs/day: 0.00     Average packs/day: 0.5 packs/day for 0.5 years (0.3 ttl pk-yrs)     Types: Cigarettes     Start date: 07/2020     Quit date: 2021     Years since quitting: 3.7    Smokeless tobacco: Never   Vaping Use    Vaping status: Never Used   Substance and Sexual Activity    Alcohol use: No    Drug use: No     Social Determinants of Health     Financial Resource Strain: Low Risk

## 2024-10-15 ENCOUNTER — OFFICE VISIT (OUTPATIENT)
Dept: FAMILY MEDICINE CLINIC | Age: 29
End: 2024-10-15
Payer: COMMERCIAL

## 2024-10-15 VITALS
HEART RATE: 98 BPM | BODY MASS INDEX: 30.38 KG/M2 | OXYGEN SATURATION: 93 % | DIASTOLIC BLOOD PRESSURE: 80 MMHG | SYSTOLIC BLOOD PRESSURE: 124 MMHG | WEIGHT: 194 LBS

## 2024-10-15 DIAGNOSIS — F31.62 BIPOLAR DISORDER, CURRENT EPISODE MIXED, MODERATE (HCC): ICD-10-CM

## 2024-10-15 DIAGNOSIS — E55.9 HYPOVITAMINOSIS D: Primary | ICD-10-CM

## 2024-10-15 DIAGNOSIS — M25.561 RIGHT KNEE PAIN, UNSPECIFIED CHRONICITY: ICD-10-CM

## 2024-10-15 PROCEDURE — 99214 OFFICE O/P EST MOD 30 MIN: CPT | Performed by: FAMILY MEDICINE

## 2024-10-15 RX ORDER — CELECOXIB 100 MG/1
100 CAPSULE ORAL DAILY PRN
Qty: 30 CAPSULE | Refills: 3 | Status: SHIPPED | OUTPATIENT
Start: 2024-10-15

## 2024-10-15 NOTE — PROGRESS NOTES
OFFICE PROGRESS NOTE      SUBJECTIVE:        Patient ID:   Noe Yusuf is a 29 y.o. female who presents for   Chief Complaint   Patient presents with    Leg Pain     No better after steroid           HPI:     RECHECK HYPOVITAMINOSIS D, RIGHT KNEE PAIN AND MOOD DISORDER.  SEEING PSYCHIATRIST FOR MOOD CONTROL.  TAKING VITAMIN D AS RECOMMENDED. DID M NOT GET BLOOD TEST DONE.   WAS GIVEN A COURSE OF PREDNISONE FOR RIGHT KNEE PAIN BY WALK IN CLINIC PHYSICIAN. IT DID NOT HELP MUCH. STILL HAS KNEE PAIN. NO PROBLEM WALKING. WILL TRY CELEBREX AS RECOMMENDED.  MEDICATION REFILL.  NOT WATCHING DIET GOOD.  PHYSICALLY ACTIVE  FOR EXERCISE.  TAKING MEDICATIONS REGULARLY.         Prior to Admission medications    Medication Sig Start Date End Date Taking? Authorizing Provider   celecoxib (CELEBREX) 100 MG capsule Take 1 capsule by mouth daily as needed for Pain 10/15/24  Yes German Blount MD   Semaglutide 3 MG TABS Take 3 mg by mouth daily 10/4/24   German Blount MD   vitamin D (ERGOCALCIFEROL) 1.25 MG (16847 UT) CAPS capsule Take 1 capsule by mouth once a week 10/4/24   German Blount MD   paliperidone palmitate ER (INVEGA SUSTENNA) 156 MG/ML CHRIS IM injection Inject 156 mg into the muscle every 30 days    ProviderShane MD     Social History     Socioeconomic History    Marital status: Single     Spouse name: None    Number of children: None    Years of education: None    Highest education level: None   Tobacco Use    Smoking status: Former     Current packs/day: 0.00     Average packs/day: 0.5 packs/day for 0.5 years (0.3 ttl pk-yrs)     Types: Cigarettes     Start date: 07/2020     Quit date: 2021     Years since quitting: 3.7    Smokeless tobacco: Never   Vaping Use    Vaping status: Never Used   Substance and Sexual Activity    Alcohol use: No    Drug use: No     Social Determinants of Health     Financial Resource Strain: Low Risk  (8/27/2024)    Overall Financial

## 2024-10-15 NOTE — PATIENT INSTRUCTIONS
REGULAR DIET  TAKE MEDICATIONS AS PER PSYCHIATRIST  FOR MOOD CONTROL.  REGULAR EXERCISE  ADVISED.  DISCUSSED AND ADVISED COMPLIANCE.  TAKE RYBELSUS 3 MG. DAILY FOR WEIGHT LOSS.  TAKE VITAMIN D-3 [id number] UNITS WEEKLY FOR VITAMIN DEFICIENCY.  TAKE CELEBREX 100 MG. DAILY FOR 1 WEEK FOR THE RIGHT KNEE PAIN AND THEN AS NEEDED.  GO TO SLEEP STUDY CLINIC AS SCHEDULED.  BLOOD DRAW  NOW FOR LAB. TESTING..  NEXT APPOINTMENT IN 3 MONTHS.

## 2024-12-12 LAB
ALBUMIN SERPL-MCNC: 4.4 G/DL (ref 3.5–5.2)
ALP SERPL-CCNC: 54 U/L (ref 35–104)
ALT SERPL-CCNC: 8 U/L (ref 0–32)
ANION GAP SERPL CALCULATED.3IONS-SCNC: 12 MMOL/L (ref 7–16)
AST SERPL-CCNC: 15 U/L (ref 0–31)
BILIRUB SERPL-MCNC: 0.7 MG/DL (ref 0–1.2)
BUN SERPL-MCNC: 9 MG/DL (ref 6–20)
CALCIUM SERPL-MCNC: 10.1 MG/DL (ref 8.6–10.2)
CHLORIDE SERPL-SCNC: 104 MMOL/L (ref 98–107)
CO2 SERPL-SCNC: 26 MMOL/L (ref 22–29)
CREAT SERPL-MCNC: 0.7 MG/DL (ref 0.5–1)
ERYTHROCYTE [DISTWIDTH] IN BLOOD BY AUTOMATED COUNT: 12.6 % (ref 11.5–15)
GFR, ESTIMATED: >90 ML/MIN/1.73M2
GLUCOSE SERPL-MCNC: 99 MG/DL (ref 74–99)
HCT VFR BLD AUTO: 44.5 % (ref 34–48)
HGB BLD-MCNC: 14.1 G/DL (ref 11.5–15.5)
LITHIUM DATE LAST DOSE: NORMAL
LITHIUM DOSE AMOUNT: NORMAL
LITHIUM DOSE TIME: NORMAL
LITHIUM LEVEL: 1 MMOL/L (ref 0.5–1.5)
MCH RBC QN AUTO: 30.3 PG (ref 26–35)
MCHC RBC AUTO-ENTMCNC: 31.7 G/DL (ref 32–34.5)
MCV RBC AUTO: 95.7 FL (ref 80–99.9)
PLATELET # BLD AUTO: 330 K/UL (ref 130–450)
PMV BLD AUTO: 11.7 FL (ref 7–12)
POTASSIUM SERPL-SCNC: 4.9 MMOL/L (ref 3.5–5)
PROT SERPL-MCNC: 7.4 G/DL (ref 6.4–8.3)
RBC # BLD AUTO: 4.65 M/UL (ref 3.5–5.5)
SODIUM SERPL-SCNC: 142 MMOL/L (ref 132–146)
WBC OTHER # BLD: 9.5 K/UL (ref 4.5–11.5)